# Patient Record
Sex: FEMALE | Race: OTHER
[De-identification: names, ages, dates, MRNs, and addresses within clinical notes are randomized per-mention and may not be internally consistent; named-entity substitution may affect disease eponyms.]

---

## 2019-10-07 ENCOUNTER — HOSPITAL ENCOUNTER (INPATIENT)
Dept: HOSPITAL 88 - ER | Age: 64
LOS: 4 days | Discharge: HOME | DRG: 291 | End: 2019-10-11
Attending: INTERNAL MEDICINE | Admitting: INTERNAL MEDICINE
Payer: COMMERCIAL

## 2019-10-07 VITALS — HEIGHT: 64 IN | WEIGHT: 189.06 LBS | BODY MASS INDEX: 32.28 KG/M2

## 2019-10-07 DIAGNOSIS — J44.1: ICD-10-CM

## 2019-10-07 DIAGNOSIS — I50.23: ICD-10-CM

## 2019-10-07 DIAGNOSIS — I11.0: Primary | ICD-10-CM

## 2019-10-07 DIAGNOSIS — F17.200: ICD-10-CM

## 2019-10-07 DIAGNOSIS — E78.5: ICD-10-CM

## 2019-10-07 DIAGNOSIS — Z87.891: ICD-10-CM

## 2019-10-07 DIAGNOSIS — J96.00: ICD-10-CM

## 2019-10-07 LAB
BASOPHILS # BLD AUTO: 0 10*3/UL (ref 0–0.1)
BASOPHILS NFR BLD AUTO: 0.3 % (ref 0–1)
DEPRECATED NEUTROPHILS # BLD AUTO: 8.6 10*3/UL (ref 2.1–6.9)
EOSINOPHIL # BLD AUTO: 0.1 10*3/UL (ref 0–0.4)
EOSINOPHIL NFR BLD AUTO: 0.8 % (ref 0–6)
ERYTHROCYTE [DISTWIDTH] IN CORD BLOOD: 13.1 % (ref 11.7–14.4)
HCT VFR BLD AUTO: 39 % (ref 34.2–44.1)
HGB BLD-MCNC: 13 G/DL (ref 12–16)
LYMPHOCYTES # BLD: 0.6 10*3/UL (ref 1–3.2)
LYMPHOCYTES NFR BLD AUTO: 6.6 % (ref 18–39.1)
MCH RBC QN AUTO: 31.2 PG (ref 28–32)
MCHC RBC AUTO-ENTMCNC: 33.3 G/DL (ref 31–35)
MCV RBC AUTO: 93.5 FL (ref 81–99)
MONOCYTES # BLD AUTO: 0.1 10*3/UL (ref 0.2–0.8)
MONOCYTES NFR BLD AUTO: 1.1 % (ref 4.4–11.3)
NEUTS SEG NFR BLD AUTO: 90.6 % (ref 38.7–80)
PLATELET # BLD AUTO: 217 X10E3/UL (ref 140–360)
RBC # BLD AUTO: 4.17 X10E6/UL (ref 3.6–5.1)

## 2019-10-07 PROCEDURE — 82553 CREATINE MB FRACTION: CPT

## 2019-10-07 PROCEDURE — 93970 EXTREMITY STUDY: CPT

## 2019-10-07 PROCEDURE — 90732 PPSV23 VACC 2 YRS+ SUBQ/IM: CPT

## 2019-10-07 PROCEDURE — 99284 EMERGENCY DEPT VISIT MOD MDM: CPT

## 2019-10-07 PROCEDURE — 94664 DEMO&/EVAL PT USE INHALER: CPT

## 2019-10-07 PROCEDURE — 85025 COMPLETE CBC W/AUTO DIFF WBC: CPT

## 2019-10-07 PROCEDURE — 83880 ASSAY OF NATRIURETIC PEPTIDE: CPT

## 2019-10-07 PROCEDURE — 71045 X-RAY EXAM CHEST 1 VIEW: CPT

## 2019-10-07 PROCEDURE — 71046 X-RAY EXAM CHEST 2 VIEWS: CPT

## 2019-10-07 PROCEDURE — 94640 AIRWAY INHALATION TREATMENT: CPT

## 2019-10-07 PROCEDURE — 36415 COLL VENOUS BLD VENIPUNCTURE: CPT

## 2019-10-07 PROCEDURE — 83735 ASSAY OF MAGNESIUM: CPT

## 2019-10-07 PROCEDURE — 82550 ASSAY OF CK (CPK): CPT

## 2019-10-07 PROCEDURE — 94660 CPAP INITIATION&MGMT: CPT

## 2019-10-07 PROCEDURE — 93005 ELECTROCARDIOGRAM TRACING: CPT

## 2019-10-07 PROCEDURE — 84484 ASSAY OF TROPONIN QUANT: CPT

## 2019-10-07 PROCEDURE — 82805 BLOOD GASES W/O2 SATURATION: CPT

## 2019-10-07 PROCEDURE — 93306 TTE W/DOPPLER COMPLETE: CPT

## 2019-10-07 PROCEDURE — 80053 COMPREHEN METABOLIC PANEL: CPT

## 2019-10-07 PROCEDURE — 36600 WITHDRAWAL OF ARTERIAL BLOOD: CPT

## 2019-10-07 NOTE — XMS REPORT
Patient Summary Document

                             Created on: 10/07/2019



SAVANAHHERBERT LINDA

External Reference #: [id number]

: 1955

Sex: Female



Demographics







                          Address                   [address]  [address]

 

                          Home Phone                [phone]

 

                          Preferred Language        Unknown

 

                          Marital Status            Unknown

 

                          Restorationism Affiliation     Unknown

 

                          Race                      Unknown

 

                          Ethnic Group              Unknown





Author







                          [facility]                    [facility]

 

                          [facility]

 

                          Address                   Unknown

 

                          Phone                     Unavailable







Support







                Name            Relationship    Address         Phone

 

                    NO,  ONE            PRS                 [facility]

                                        [address]  [address] [phone]

 

                    NO,  ONE            PRS                 [facility]

                                        [address]  [address] [phone]







Care Team Providers







                    Care Team Member Name    Role                Phone

 

                          Unavailable               Unavailable







Payers







             Payer Name    Policy Type    Policy Number    Effective Date    Expiration Date







Problems

This patient has no known problems.



Allergies, Adverse Reactions, Alerts







          Allergy Name    Allergy Type    Status    Severity    Reaction(s)    Onset Date    Inactive 

Date                      Treating Clinician        Comments

 

        No Known Allergies    DA      Active    U               2019-09-10 00:00:00                     

 

        No Known Allergies    DA      Active    U               2019 00:00:00                     







Medications

This patient has no known medications.



Results







           Test Description    Test Time    Test Comments    Text Results    Atomic Results    Result

 Comments

 

                GLUBED          2019 06:44:00                      

 

   

 

                GLUBED (test code=GLUBED)    143 mg/dL                 Performed by certified  at

 [facility]





AB MYCOPLASMA [id number] 01:06:00* 





                Test Item       Value           Reference Range    Comments

 

                AB MYCOPLAS PNEUMONIAE IGM EIA (test code=MYCOMABEIA)    <770 U/mL       0-769                  

                      Negative            <770Clinically significant amount of 
M. pneumoniae antibodynot detected.                             Low Positive   
770 - 950M. pneumoniae specific IgM presumptively detected.  Itis recommended 
that another sample be collected 1-2weeks later to assure reactivity.           
                 Positive            >950Highly significant amount of M. p
neumoniae specificIgM antibody detected.Performed At: [address] [id number] Stewart MD Ph:[phone]            
Negative            <770     Clinically significant amount of M.pneumoniae 
antibody  not detected.               Low Positive        770 - 950     M. 
pneumoniae specific IgM presumptively detected.    It is recommended that 
another sample be collected  1-2 weeks later to assure reactivity.              
Positive           >950     Highly significant amount of M.pneumoniae specific  
  IgM antibody detected. 

 

                AB MYCOPLAS PNEUMO IGG EIA (test code=MYCOGABEIA)    <100 U/mL       0-99                        

                 Negative:           <100                             
Indeterminate: 100 - 320                             Positive:           >320The
reference interval established is intended as abaseline only.  Values >100 may 
indicate a recentinfection with Mycoplasma pneumoniae and need to beconfirmed 
either by a positive IgM result and/or anadditional specimen drawn 2-4 weeks 
later showing asignificant increase in antibody levels.  Negative:           
<100     Indeterminate: 100 - 320     Positive:           >320 





COMPREHENSIVE METABOLIC PANEL2019-09-15 10:08:00* 





                Test Item       Value           Reference Range    Comments

 

                SODIUM (test code=NA)    137 mmol/L      136-145          

 

                POTASSIUM (test code=K)    3.9 mmol/L      3.5-5.1          

 

                CHLORIDE (test code=CL)    99.0 mmol/L                

 

                CARBON DIOXIDE (test code=CO2)    32.0 mmol/L     21-32            

 

                ANION GAP (test code=GAP)    9.9             10-20            

 

                GLUCOSE (test code=GLU)    97 mg/dL                   

 

                BLOOD UREA NITROGEN (test code=BUN)    10 mg/dL        7-18             

 

                GLOMERULAR FILTRATION RATE (test code=GFR)    > 60 mL/min     >=60            Estimated GFR by 

using Modified MDRD formula.Chronic kidney disease is defined as either kidney 
damageor GFR <60 mL/min/1.73 m2 for >3 months.

 

                CREATININE (test code=CREAT)    0.60 mg/dL      0.55-1.02       **Note change in reference 

range due to change in reagent.**

 

                BUN/CREATININE RATIO (test code=BUN/CREA)    16.7            10-20            

 

                TOTAL PROTEIN (test code=PROT)    6.2 gram/dL     6.4-8.2          

 

                ALBUMIN (test code=ALB)    2.4 g/dL        3.4-5.0          

 

                GLOBULIN (test code=GLOB)    3.8 gram/dL     2.7-4.2          

 

                ALBUMIN/GLOBULIN RATIO (test code=A/G)    0.6             0.75-1.50        

 

                CALCIUM (test code=CA)    9.5 mg/dL       8.5-10.1         

 

                BILIRUBIN TOTAL (test code=BILT)    0.20 mg/dL      0.0-1.0          

 

                SGOT/AST (test code=AST)    16 IUnit/L      15-37            

 

                SGPT/ALT (test code=ALT)    60 IUnit/L      12-78            

 

                ALKALINE PHOSPHATASE TOTAL (test code=ALKP)    104 IUnit/L               **Note change 

in reference range due to change in reagent.**





MAGNESIUM2019-09-15 10:08:00* 





                Test Item       Value           Reference Range    Comments

 

                MAGNESIUM (test code=MAG)    1.9 mg/dL       1.8-2.4          





ALPHA 1 ANTITRYPSIN2019-09-15 10:08:00* 





                Test Item       Value           Reference Range    Comments

 

                ALPHA 1 ANTITRYPSIN (test code=NQNX3THN)    237 mg/dL                 Performed At: [facility]

 [id number] [facility] [address] [place], [place] [id number] CN MD Ph:[phone]





BASIC METABOLIC [id number] 05:46:00* 





                Test Item       Value           Reference Range    Comments

 

                SODIUM (test code=NA)    137 mmol/L      136-145          

 

                POTASSIUM (test code=K)    4.3 mmol/L      3.5-5.1          

 

                CHLORIDE (test code=CL)    98.0 mmol/L                

 

                CARBON DIOXIDE (test code=CO2)    33.0 mmol/L     21-32            

 

                ANION GAP (test code=GAP)    10.3            10-20            

 

                GLUCOSE (test code=GLU)    113 mg/dL                  

 

                BLOOD UREA NITROGEN (test code=BUN)    14 mg/dL        7-18             

 

                GLOMERULAR FILTRATION RATE (test code=GFR)    > 60 mL/min     >=60            Estimated GFR by 

using Modified MDRD formula.Chronic kidney disease is defined as either kidney 
damageor GFR <60 mL/min/1.73 m2 for >3 months.

 

                CREATININE (test code=CREAT)    0.60 mg/dL      0.55-1.02       **Note change in reference 

range due to change in reagent.**

 

                BUN/CREATININE RATIO (test code=BUN/CREA)    23.3            10-20            

 

                CALCIUM (test code=CA)    10.1 mg/dL      8.5-10.1         





BASIC METABOLIC [id number] 05:36:00* 





                Test Item       Value           Reference Range    Comments

 

                SODIUM (test code=NA)    137 mmol/L      136-145          

 

                POTASSIUM (test code=K)    4.3 mmol/L      3.5-5.1          

 

                CHLORIDE (test code=CL)    98.0 mmol/L                

 

                CARBON DIOXIDE (test code=CO2)     mmol/L         21-32            

 

                ANION GAP (test code=GAP)                    10-20            

 

                GLUCOSE (test code=GLU)     mg/dL                     

 

                BLOOD UREA NITROGEN (test code=BUN)     mg/dL          7-18             

 

                GLOMERULAR FILTRATION RATE (test code=GFR)     mL/min         >=60             

 

                CREATININE (test code=CREAT)     mg/dL          0.55-1.02        

 

                BUN/CREATININE RATIO (test code=BUN/CREA)                    10-20            

 

                CALCIUM (test code=CA)     mg/dL          8.5-10.1         





- XR FOREARM 2 VIEWS [id number] 14:24:00 FAX:         Ernesto Solis MD 
[phone]    [place]:    St: ADM----------
---------------------------------------------------------------------  Name:   LINDA GUTIERREZ                    [facility]                     : [birth year]  Age/S: 64/F           [address] [facility]                Unit #: [id number]    
 Loc: [place]  [address]              Phys: Ernesto Solis MD 
                                              Acct: [id number] Dis Date:      
        Status: ADM IN                                 PHONE #: [phone]    
Exam Date:     2019     1404                   FAX #: [phone]     
Reason: RIGHT RADIAL HEAD FRACTURE                         EXAMS:               
                               CPT CODE:      [id number] XR FOREARM 2 VIEWS RT   
                  [address]                    CLINICAL HISTORY: RIGHT RADIAL HEAD 
FRACTURE               TECHNIQUE: 2 views of the right forearm               C
OMPARISON: None               FINDINGS:               Interval placement of cast
partly obscures fine bony details and soft       tissues.       Redemonstration 
of minimally displaced fracture of the radial head. No       change in bony ali
gnment. Remaining bones are within normal limits.                               
 IMPRESSION:                   Stable appearance of the radial head fracture fr
om the previous         examination.          ** Electronically Signed by Jesus Blount MD on 2019 at 1424 **                      Reported and signed by: RUI Blount MD                     CC: Ernesto Solis MD                      
                                                                                
            Technologist: BONNIE JALLOH RT(R)                                    
Trnscrd Date/Time/By: 2019 (9769) : By: [email or web address]          Orig Print 
D/T: S: 2019 (7190)                         PAGE  1                       
Signed Report                               SED RATE [id number] 
13:03:00* 





                Test Item       Value           Reference Range    Comments

 

                SED RATE WESTERGREN (test code=SEDW)    47 mm/hr        0-20             





SED [id number] 13:03:00* 





                Test Item       Value           Reference Range    Comments

 

                SED RATE (test code=SEDW)    47 mm/hr        0-20            WINTROBE METHOD: NORMAL RANGE FOR MEN:

 0-9 MM/HR                  WOMAN: 0-20 MM/HR





COMPREHENSIVE METABOLIC [id number] 06:29:00* 





                Test Item       Value           Reference Range    Comments

 

                SODIUM (test code=NA)    137 mmol/L      136-145          

 

                POTASSIUM (test code=K)    3.9 mmol/L      3.5-5.1          

 

                CHLORIDE (test code=CL)    99.0 mmol/L                

 

                CARBON DIOXIDE (test code=CO2)    32.0 mmol/L     21-32            

 

                ANION GAP (test code=GAP)    9.9             10-20            

 

                GLUCOSE (test code=GLU)    97 mg/dL                   

 

                BLOOD UREA NITROGEN (test code=BUN)    10 mg/dL        7-18             

 

                GLOMERULAR FILTRATION RATE (test code=GFR)    > 60 mL/min     >=60            Estimated GFR by 

using Modified MDRD formula.Chronic kidney disease is defined as either kidney 
damageor GFR <60 mL/min/1.73 m2 for >3 months.

 

                CREATININE (test code=CREAT)    0.60 mg/dL      0.55-1.02       **Note change in reference 

range due to change in reagent.**

 

                BUN/CREATININE RATIO (test code=BUN/CREA)    16.7            10-20            

 

                TOTAL PROTEIN (test code=PROT)    6.2 gram/dL     6.4-8.2          

 

                ALBUMIN (test code=ALB)    2.4 g/dL        3.4-5.0          

 

                GLOBULIN (test code=GLOB)    3.8 gram/dL     2.7-4.2          

 

                ALBUMIN/GLOBULIN RATIO (test code=A/G)    0.6             0.75-1.50        

 

                CALCIUM (test code=CA)    9.5 mg/dL       8.5-10.1         

 

                BILIRUBIN TOTAL (test code=BILT)    0.20 mg/dL      0.0-1.0          

 

                SGOT/AST (test code=AST)    16 IUnit/L      15-37            

 

                SGPT/ALT (test code=ALT)    60 IUnit/L      12-78            

 

                ALKALINE PHOSPHATASE TOTAL (test code=ALKP)    104 IUnit/L               **Note change 

in reference range due to change in reagent.**





[id number] 06:29:00* 





                Test Item       Value           Reference Range    Comments

 

                MAGNESIUM (test code=MAG)    1.9 mg/dL       1.8-2.4          





ALPHA 1 [id number] 06:29:00* 





                Test Item       Value           Reference Range    Comments

 

                ALPHA 1 ANTITRYPSIN (test code=IBXB1TXD)     mg/dL                           





C REACTIVE [id number] 06:29:00* 





                Test Item       Value           Reference Range    Comments

 

                C REACTIVE PROTEIN (test code=CRP)    3.96 mg/dL      0-0.3            





B-TYPE NATRIURETIC [id number] 06:13:00* 





                Test Item       Value           Reference Range    Comments

 

                B-TYPE NATRIURETIC PEPTIDE (test code=BNP)    402.56 pgram/mL    0-100            





COMPREHENSIVE METABOLIC [id number] 06:02:00* 





                Test Item       Value           Reference Range    Comments

 

                SODIUM (test code=NA)    137 mmol/L      136-145          

 

                POTASSIUM (test code=K)    3.9 mmol/L      3.5-5.1          

 

                CHLORIDE (test code=CL)    99.0 mmol/L                

 

                CARBON DIOXIDE (test code=CO2)     mmol/L         21-32            

 

                ANION GAP (test code=GAP)                    10-20            

 

                GLUCOSE (test code=GLU)     mg/dL                     

 

                BLOOD UREA NITROGEN (test code=BUN)     mg/dL          7-18             

 

                GLOMERULAR FILTRATION RATE (test code=GFR)     mL/min         >=60             

 

                CREATININE (test code=CREAT)     mg/dL          0.55-1.02        

 

                BUN/CREATININE RATIO (test code=BUN/CREA)                    10-20            

 

                TOTAL PROTEIN (test code=PROT)     gram/dL        6.4-8.2          

 

                ALBUMIN (test code=ALB)     g/dL           3.4-5.0          

 

                GLOBULIN (test code=GLOB)     gram/dL        2.7-4.2          

 

                ALBUMIN/GLOBULIN RATIO (test code=A/G)                    0.75-1.50        

 

                CALCIUM (test code=CA)     mg/dL          8.5-10.1         

 

                BILIRUBIN TOTAL (test code=BILT)     mg/dL          0.0-1.0          

 

                SGOT/AST (test code=AST)     IUnit/L        15-37            

 

                SGPT/ALT (test code=ALT)     IUnit/L        12-78            

 

                ALKALINE PHOSPHATASE TOTAL (test code=ALKP)     IUnit/L                   





[id number] 06:02:00* 





                Test Item       Value           Reference Range    Comments

 

                MAGNESIUM (test code=MAG)     mg/dL          1.8-2.4          





ALPHA 1 [id number] 06:02:00* 





                Test Item       Value           Reference Range    Comments

 

                ALPHA 1 ANTITRYPSIN (test code=[id number])     mg/dL                           





CBC W/AUTO [id number] 06:01:00* 





                Test Item       Value           Reference Range    Comments

 

                WHITE BLOOD CELL (test code=WBC)    7.3 K/mm3       4.5-12.5         

 

                RED BLOOD CELL (test code=RBC)    3.53 mill/mm3    3.7-5.2          

 

                HEMOGLOBIN (test code=HGB)    11.1 gram/dL    11.5-15.5        

 

                HEMATOCRIT (test code=HCT)    33.5 %          36.0-46.0        

 

                MEAN CELL VOLUME (test code=MCV)    94.9 fL         80-98            

 

                MEAN CELL HGB (test code=MCH)    31.4 picogram    27.0-33.0        

 

                MEAN CELL HGB CONCETRATION (test code=MCHC)    33.1 gram/dL    33.0-36.0        

 

                RED CELL DISTRIBUTION WIDTH (test code=RDW)    13.5 %          11.6-16.2        

 

                RED CELL DISTRIBUTION WIDTH SD (test code=RDW-SD)    46.6 fL         37.0-51.0        

 

                PLATELET COUNT (test code=PLT)    242 K/mm3       150-450         RESULT VERIFIED BY REPEAT ANALYSIS



 

                MEAN PLATELET VOLUME (test code=MPV)    10.1 fL         6.7-11.0         

 

                NEUTROPHIL % (test code=NT%)    74.5 %          39.0-69.0        

 

                IMMATURE GRANULOCYTE % (test code=IG%)    0.7 %           0.0-5.0          

 

                LYMPHOCYTE % (test code=LY%)    16.3 %          25.0-55.0        

 

                MONOCYTE % (test code=MO%)    8.3 %           0.0-10.0         

 

                EOSINOPHIL % (test code=EO%)    0.1 %           0.0-5.0          

 

                BASOPHIL % (test code=BA%)    0.1 %           0.0-1.0          

 

                NUCLEATED RBC % (test code=NRBC%)    0.0 %           0-0              

 

                NEUTROPHIL # (test code=NT#)    5.40 K/mm3      1.8-7.7          

 

                IMMATURE GRANULOCYTE # (test code=IG#)    0.05 x10 3/uL    0-0.03           

 

                LYMPHOCYTE # (test code=LY#)    1.18 K/mm3      1.0-5.0          

 

                MONOCYTE # (test code=MO#)    0.60 K/mm3      0-0.8            

 

                EOSINOPHIL # (test code=EO#)    0.01 K/mm3      0.0-0.5          

 

                BASOPHIL # (test code=BA#)    0.01 K/mm3      0.0-0.2          

 

                NUCLEATED RBC # (test code=NRBC#)    0.00 K/mm3      0.0-0.1          

 

                MANUAL DIFF REQUIRED (test code=MDIFF)    NO                               





LEGIONELLA ANTIGEN,URINE,[id number] 02:39:00* 





                Test Item       Value           Reference Range    Comments

 

                LEGIONELLA ANTIGEN,URINE,ERICKA (test code=LEGAGUR)    NEGATIVE        NEGATIVE         





- CTA [id number] 18:10:00  Name: LINDA JOSHUA                     [facility]                     : 1955 Age/S: 64  / F         [address] 
[facility]                Unit #: [id number]     Loc:               [place],  
[place]  [address]              Phys: Dick Garcia MD                                  
                Acct: [id number]  Dis Date:               Status: ADM IN      
                           PHONE #: [phone]     Exam Date: 2019  6192
                    FAX #: [phone]      Reason: hypoxemic respfailure, 
shortness of breath          EXAMS:                                             
 CPT CODE:      [id number] CTA CHEST                                  [address]      
             REASON FOR EXAM: hypoxemic respfailure, shortness of breath        
      EXAM ORDER DATE: 2019 12:35 PM               Ordering M.DDeanne: Dick Garcia MD               PROCEDURE:  - CTA CHEST                
Comparison:Frontal chest x-ray earlier today at 10:48 AM               Axial CT 
images of the chest were obtained following administration of       IV contrast 
using a PE protocol. Reconstructed sagittal and coronal       images of the 
chest were provided for interpretation.  Dose reduction       techniques were 
applied.                       FINDINGS:                Visualized neck: Normal 
             Airways, Lungs and Pleura: Central airways are clear. There is     
 subsegmental atelectasis in the bilateral lung bases, more pronounced       on 
the right side. No pleural effusion or pneumothorax.       In the posterior 
segment of the right upper lobe (3/25 through 3/27)       there are a few 
airspace opacities which may represent an infectious       process.             
 Heart, great vessels, pulmonary vessels, mediastinum: No pulmonary       
embolus to the level of the segmental arteries. Pulmonary trunk is       s
lightly enlarged measuring 3.2 cm in diameter. No evidence of right       heart 
strain is seen. Atherosclerotic disease is scattered throughout       the thorac
ic aorta and the coronary arteries. No pericardial effusion.               Lymph
nodes: Prominent hilar lymph nodes are seen bilaterally. There       are also s
ubcentimeter mediastinal lymph nodes. No axillary or       internal mammary celeste
opathy.               Musculoskeletal/chest wall: Degenerative changes are prese
nt in the       bilateral shoulders and throughout the spine.               Visu
alized upper abdomen: Normal                         IMPRESSION:               P
AGE  3                       Signed Report                    (CONTINUED)   Name
: LINDA JOSHUA                     [facility]                     :  Age/S: 64  / F         [address] [facility]                Unit #: [id number]
    Loc:               [place]  [address]              Phys: Dick Garcia MD
                                                  Acct: [id number]  Dis Date: 
             Status: ADM IN                                  PHONE #: [phone]
[phone]     Exam Date: 2019  175                     FAX #: [phone]    
 Reason: hypoxemic respfailure, shortness of breath          EXAMS:             
                                 CPT CODE:      [id number] CTA CHEST             
                    [address]               <Continued>          No pulmonary 
embolus or evidence of right heart strain.         Increased caliber of the 
pulmonary trunk suggests pulmonary artery         hypertension.         
Opacities in the posterior segment of the right upper lobe may         represent
an infectious process.         Bilateral hilar adenopathy is present. These are 
nonspecific and may         be reactive.         Mild subsegmental atelectasis 
in the bilateral lung bases.          ** Electronically Signed by Jesus Blount MD
on 2019 at 1810 **                      Reported and signed by: Jesus Blount MD                              CC: Ernesto Solis MD; Dick Garcia MD                                                                              
                  Technologist:Katy Wharton RT(R),CT;   CTDI:        DLP:
       Trnscb Date/Time: 2019 () [email or web address]                       Orig
Print D/T: S: 2019 ()      PAGE  2                       Signed Report
                              ARTERIAL BLOOD [id number] 14:51:00* 





                Test Item       Value           Reference Range    Comments

 

                ARTERIAL BLOOD GAS PH (test code=PHA)    7.45            7.35-7.45        

 

                ARTERIAL BLOOD GAS PCO2 (test code=PCO2A)    43.5 mm Hg      35-45            

 

                ARTERIAL BLOOD GAS PO2 (test code=PO2A)    42.4 mmHg                 Results called to and

 read back by [employer] 14:50 - 2019; by [employer]

 

                BICARBONATE TOTAL HCO3 (test code=HCO3)    29.7 mmol/L     23.0-27.0        

 

                BASE EXCESS (test code=ANJALI)    5.1 mmol/L      -3.0-5.0         

 

                ABG O2 SATURATION (test code=SATA)    80.5 %          90.0-98.0        

 

                ABG TYPE (test code=TYPEA)    Arterial                         

 

                FIO2 (test code=FIO2A)    21.0                             

 

                ABG SITE (test code=SITEA)    Lt RADIAL ARTERY                     

 

                MODIFIED ALLENS (test code=MODALL)    Yes CHECK       PERFORMED        

 

                HEMATOCRIT (test code=HCT/ABG)    36 %            35-47            

 

                TOTAL HGB (test code=THB)    12.4 gram/dL    11.5-15.5        

 

                HGB O2 SAT (test code=HBOSAT)    80.2 %          94.00-98.00      

 

                CARBOXYHEMOGLOBIN (test code=HOHGBT)    0.1 %totalHg    0.5-1.5         Results called to and

 read back by [employer] 14:50 - 2019; by [employer]

 

                METHEMOGLOBIN (test code=METHGB)    0.3 %           0.0-1.50         

 

                O2 CONTENT (test code=O2CT)    14.0 % vol      18.0-22.0        





PROCALCITONIN (PCT)2019 13:07:00* 





                Test Item       Value           Reference Range    Comments

 

                PROCALCITONIN (PCT) (test code=PROCAL)    0.20 ng/ml                      Concentration   Interpretation

   (ng/mL)      -------------   
--------------------------------------------<0.51           Sepsis is not 
likely.                Local bacterial infection is possible.                
(LOW RISK for progression to Sepsis) 0.51 - 2.00     Sepsis is possible, but 
other conditions                are known to elevate PCT as well.               
(MODERATE RISK for progression to Sepsis) > 2.00          Sepsis is likely, 
unless other causes are                known.                (HIGH RISK for 
progression to Severe Sepsis                or Septic Shock) 10.00           
High likelihood of Severe Sepsis or Septic  or higher     Shock.                
*Increased PCT levels may not always be related to systemic bacterial 
infection.*Low PCT levels do not automatically exclude the presence of bacterial
infection.*All results should be interpreted taking into account the  patients 
history.





[id number] 12:25:00* 





                Test Item       Value           Reference Range    Comments

 

                GLUBED (test code=GLUBED)    152 mg/dL                 Performed by certified  at

 [facility]





- XR CHEST 1 -52-66 10:58:00 FAX:         Ernesto Solis [phone]
   [place]: B   St: ADM----------
---------------------------------------------------------------------  Name:   LINDA GUTIERREZ                    [facility]                     : [birth year]  Age/S: 64/F           [address] [facility]                Unit #: [id number]    
 Loc: [place]  [address]              Phys: Ernesto Solis MD 
                                              Acct: [id number] Dis Date:      
        Status: ADM IN                                 PHONE #: [phone]    
Exam Date:     2019     1057                   FAX #: [phone]     
Reason: hypoxia                                            EXAMS:               
                               CPT CODE:      [id number] XR CHEST 1 V            
                  [id number]                    HISTORY: Hypoxia.               CO
MPARISON: 2019.               No acute infiltrates, effusion or co
ngestion is noted. Lung scarring.       Cardiomegaly.                  IMPRESSIO
N:                    No acute infiltrates, effusion or congestion.          ** 
Electronically Signed by PERI Sexton on 2019 at 1058 **             
        Reported and signed by: Magdiel Sexton M.D.                              
CC: Ernesto Solis MD                                                        
                                                           Technologist: RT MAIN(R)                                     Trnscrd Date/Time/By: 20
19 (8739) : By: [email or web address]           Orig Print D/T: S: 2019 (3962)        
                PAGE  1                       Signed Report                     
         BASIC METABOLIC [id number] 02:19:00* 





                Test Item       Value           Reference Range    Comments

 

                SODIUM (test code=NA)    138 mmol/L      136-145          

 

                POTASSIUM (test code=K)    3.8 mmol/L      3.5-5.1          

 

                CHLORIDE (test code=CL)    101.0 mmol/L               

 

                CARBON DIOXIDE (test code=CO2)    27.0 mmol/L     21-32            

 

                ANION GAP (test code=GAP)    13.8            10-20            

 

                GLUCOSE (test code=GLU)    131 mg/dL                  

 

                BLOOD UREA NITROGEN (test code=BUN)    12 mg/dL        7-18             

 

                GLOMERULAR FILTRATION RATE (test code=GFR)    > 60 mL/min     >=60            Estimated GFR by 

using Modified MDRD formula.Chronic kidney disease is defined as either kidney 
damageor GFR <60 mL/min/1.73 m2 for >3 months.

 

                CREATININE (test code=CREAT)    0.70 mg/dL      0.55-1.02       **Note change in reference 

range due to change in reagent.**

 

                BUN/CREATININE RATIO (test code=BUN/CREA)    17.1            10-20            

 

                CALCIUM (test code=CA)    8.6 mg/dL       8.5-10.1         





[id number] 02:19:00* 





                Test Item       Value           Reference Range    Comments

 

                MAGNESIUM (test code=MAG)    1.8 mg/dL       1.8-2.4          





BASIC METABOLIC [id number] 02:03:00* 





                Test Item       Value           Reference Range    Comments

 

                SODIUM (test code=NA)    138 mmol/L      136-145          

 

                POTASSIUM (test code=K)    3.8 mmol/L      3.5-5.1          

 

                CHLORIDE (test code=CL)    101.0 mmol/L               

 

                CARBON DIOXIDE (test code=CO2)     mmol/L         21-32            

 

                ANION GAP (test code=GAP)                    10-20            

 

                GLUCOSE (test code=GLU)     mg/dL                     

 

                BLOOD UREA NITROGEN (test code=BUN)     mg/dL          7-18             

 

                GLOMERULAR FILTRATION RATE (test code=GFR)     mL/min         >=60             

 

                CREATININE (test code=CREAT)     mg/dL          0.55-1.02        

 

                BUN/CREATININE RATIO (test code=BUN/CREA)                    10-20            

 

                CALCIUM (test code=CA)     mg/dL          8.5-10.1         





[id number] 02:03:00* 





                Test Item       Value           Reference Range    Comments

 

                MAGNESIUM (test code=MAG)     mg/dL          1.8-2.4          





CBC W/O [id number] 01:33:00* 





                Test Item       Value           Reference Range    Comments

 

                WHITE BLOOD CELL (test code=WBC)    7.1 K/mm3       4.5-12.5         

 

                RED BLOOD CELL (test code=RBC)    3.64 mill/mm3    3.7-5.2          

 

                HEMOGLOBIN (test code=HGB)    11.4 gram/dL    11.5-15.5        

 

                HEMATOCRIT (test code=HCT)    35.4 %          36.0-46.0        

 

                MEAN CELL VOLUME (test code=MCV)    97.3 fL         80-98            

 

                MEAN CELL HGB (test code=MCH)    31.3 picogram    27.0-33.0        

 

                MEAN CELL HGB CONCETRATION (test code=MCHC)    32.2 gram/dL    33.0-36.0        

 

                RED CELL DISTRIBUTION WIDTH (test code=RDW)    13.3 %          11.6-16.2        

 

                PLATELET COUNT (test code=PLT)    187 K/mm3       150-450          

 

                MEAN PLATELET VOLUME (test code=MPV)    9.8 fL          6.7-11.0         





B-TYPE NATRIURETIC [id number] 17:19:00* 





                Test Item       Value           Reference Range    Comments

 

                B-TYPE NATRIURETIC PEPTIDE (test code=BNP)    175.24 pgram/mL    0-100            





- XR CHEST 1 -85-94 16:01:00 FAX:         Ernesto Solis [phone]
   [place]: B   St: ADM----------
---------------------------------------------------------------------  Name:   LINDA GUTIERREZ                    [facility]                     : [birth year]  Age/S: 64/F           [address]                Unit #: [id number]    
 Loc: [place]  [address]              Phys: Ernesto Solis MD 
                                              Acct: [id number] Dis Date:      
        Status: ADM IN                                 PHONE #: [phone]    
Exam Date:     2019     1507                   FAX #: [phone]     
Reason: shortness of breath                                EXAMS:               
                               CPT CODE:      [id number] XR CHEST 1 V            
                  [id number]                            REASON FOR EXAM: shortness of
breath               Exam Order Date: 2019 2:55 PM               Ordering 
M.D.: Ernesto Solis MD               PROCEDURE:  - XR CHEST 1 V             
 COMPARISON: Frontal chest x-ray the previous night               FINDINGS:     
   There is engorgement of the pulmonary vessels and there are subtle       
airspace opacities in the lung bases. No pleural effusion.               Cardio
mediastinal silhouette is prominent but stable in size.               Degenerati
ve changes are present in the spine. Upper abdomen is within       normal limits
.                         IMPRESSION:         Findings of CHF with pulmonary tony
ma in the lower lung zones.         Superimposed infection would be difficult to
exclude.          ** Electronically Signed by Jesus Blount MD on 2019 at 1
601 **                      Reported and signed by: Jesus Blount MD              
 CC: Ernesto Solis MD                                                       
                                                            Technologist: Kitty Christianson RT(R)                                   Trnscrd Date/Time/By: 2019
(1605) : By: [email or web address]          Orig Print D/T: S: 2019 (1614)           
             PAGE  1                       Signed Report                        
      [id number]--84-11 11:57:00* 





                Test Item       Value           Reference Range    Comments

 

                TROPONIN-I (test code=TROPI)    <0.015 ng/mL    0-0.045          





COMMENTS TO PHLEBOTOMIST: COLLECT 3 HOURS AFTER PREVIOUS                        
  [id number]--30-11 08:39:00* 





                Test Item       Value           Reference Range    Comments

 

                TROPONIN-I (test code=TROPI)    <0.015 ng/mL    0-0.045          





COMMENTS TO PHLEBOTOMIST: COLLECT 3 HOURS AFTER PREVIOUS                        
  SAMPLEURINALYSIS [id number] 07:28:00* 





                Test Item       Value           Reference Range    Comments

 

                UA COLOR (test code=COLU)    YELLOW          YELLOW           

 

                UA APPEARANCE (test code=APPU)    Cloudy          CLEAR            

 

                UA GLUCOSE DIPSTICK (test code=DGLUU)    NEGATIVE mg/dL    NEGATIVE         

 

                UA BILIRUBIN DIPSTICK (test code=BILU)    NEGATIVE mg/dL    NEGATIVE         

 

                UA KETONE DIPSTICK (test code=KETU)    NEGATIVE mg/dL    NEGATIVE         

 

                UA SPECIFIC GRAVITY (test code=SGU)    1.008           1.001-1.035      

 

                UA BLOOD DIPSTICK (test code=DONNA)    0.03 mg/dL (Trace) mg/dL    NEGATIVE         

 

                UA PH DIPSTICK (test code=DIDI)    6.0             5.0-8.0          

 

                UA PROTEIN DIPSTICK (test code=PROU)    20 (Trace) mg/dL    NEGATIVE         

 

                UA UROBILINIOGEN DIPSTICK (test code=URO)    Normal mg/dL    NEGATIVE         

 

                UA NITRITE DIPSTICK (test code=HAILEE)    NEGATIVE        NEGATIVE         

 

                    UA LEUKOCYTE ESTERASE W REFLEX (test code=LEUUR)    75 Endy/uL (1+) Endy/uL    NEGATIVE

                                         

 

                UA WBC (test code=WBCU)    11-20 per HPF    0-5              

 

                UA RBC (test code=RBCU)    3-5 #/HPF       0-5              

 

                UA EPITHELIAL CELLS (test code=EPIU)    FEW per HPF     FEW              

 

                UA BACTERIA (test code=BACU)    MODERATE #/HPF    NONE             

 

                UA MUCUS (test code=MUCU)    FEW #/LPF       FEW              





Urine Source? Clean CatchBASIC METABOLIC [id number] 00:26:00* 





                Test Item       Value           Reference Range    Comments

 

                SODIUM (test code=NA)    135 mmol/L      136-145          

 

                POTASSIUM (test code=K)    3.8 mmol/L      3.5-5.1          

 

                CHLORIDE (test code=CL)    98.0 mmol/L                

 

                CARBON DIOXIDE (test code=CO2)    31.0 mmol/L     21-32            

 

                ANION GAP (test code=GAP)    9.8             10-20            

 

                GLUCOSE (test code=GLU)    128 mg/dL                  

 

                BLOOD UREA NITROGEN (test code=BUN)    8 mg/dL         7-18             

 

                GLOMERULAR FILTRATION RATE (test code=GFR)    > 60 mL/min     >=60            Estimated GFR by 

using Modified MDRD formula.Chronic kidney disease is defined as either kidney 
damageor GFR <60 mL/min/1.73 m2 for >3 months.

 

                CREATININE (test code=CREAT)    0.70 mg/dL      0.55-1.02       **Note change in reference 

range due to change in reagent.**

 

                BUN/CREATININE RATIO (test code=BUN/CREA)    11.4            10-20            

 

                CALCIUM (test code=CA)    9.1 mg/dL       8.5-10.1         





HEPATIC FUNCTION [id number] 00:26:00* 





                Test Item       Value           Reference Range    Comments

 

                TOTAL PROTEIN (test code=PROT)    7.2 gram/dL     6.4-8.2          

 

                ALBUMIN (test code=ALB)    3.0 g/dL        3.4-5.0          

 

                GLOBULIN (test code=GLOB)    4.2 gram/dL     2.7-4.2          

 

                ALBUMIN/GLOBULIN RATIO (test code=A/G)    0.7             0.75-1.50        

 

                BILIRUBIN TOTAL (test code=BILT)    0.70 mg/dL      0.0-1.0          

 

                BILIRUBIN DIRECT (test code=BILD)    0.22 mg/dL      0.0-0.20         

 

                SGOT/AST (test code=AST)    11 IUnit/L      15-37            

 

                SGPT/ALT (test code=ALT)    18 IUnit/L      12-78            

 

                ALKALINE PHOSPHATASE TOTAL (test code=ALKP)    90 IUnit/L                **Note change in

 reference range due to change in reagent.**





[id number] 00:26:00* 





                Test Item       Value           Reference Range    Comments

 

                TROPONIN-I (test code=TROPI)    <0.015 ng/mL    0-0.045          





LACTIC [id number] 00:23:00* 





                Test Item       Value           Reference Range    Comments

 

                LACTIC ACID (test code=LACT)    0.9 mmol/L      0.4-1.9          





BASIC METABOLIC [id number] 00:23:00* 





                Test Item       Value           Reference Range    Comments

 

                SODIUM (test code=NA)    135 mmol/L      136-145          

 

                POTASSIUM (test code=K)    3.8 mmol/L      3.5-5.1          

 

                CHLORIDE (test code=CL)    98.0 mmol/L                

 

                CARBON DIOXIDE (test code=CO2)     mmol/L         21-32            

 

                ANION GAP (test code=GAP)                    10-20            

 

                GLUCOSE (test code=GLU)     mg/dL                     

 

                BLOOD UREA NITROGEN (test code=BUN)     mg/dL          7-18             

 

                GLOMERULAR FILTRATION RATE (test code=GFR)     mL/min         >=60             

 

                CREATININE (test code=CREAT)     mg/dL          0.55-1.02        

 

                BUN/CREATININE RATIO (test code=BUN/CREA)                    10-20            

 

                CALCIUM (test code=CA)     mg/dL          8.5-10.1         





HEPATIC FUNCTION [id number] 00:23:00* 





                Test Item       Value           Reference Range    Comments

 

                TOTAL PROTEIN (test code=PROT)     gram/dL        6.4-8.2          

 

                ALBUMIN (test code=ALB)     g/dL           3.4-5.0          

 

                GLOBULIN (test code=GLOB)     gram/dL        2.7-4.2          

 

                ALBUMIN/GLOBULIN RATIO (test code=A/G)                    0.75-1.50        

 

                BILIRUBIN TOTAL (test code=BILT)     mg/dL          0.0-1.0          

 

                BILIRUBIN DIRECT (test code=BILD)     mg/dL          0.0-0.20         

 

                SGOT/AST (test code=AST)     IUnit/L        15-37            

 

                SGPT/ALT (test code=ALT)     IUnit/L        12-78            

 

                ALKALINE PHOSPHATASE TOTAL (test code=ALKP)     IUnit/L                   





[id number]--01-11 00:23:00* 





                Test Item       Value           Reference Range    Comments

 

                TROPONIN-I (test code=TROPI)     ng/mL          0-0.045          





- XR CHEST 1 -03-65 00:20:00 FAX:         Jerad Clemons MD    [phone]
   [place]: B   St: REG----------
---------------------------------------------------------------------  Name:   LINDA GUTIERREZ                    [facility]                     : [birth year]  Age/S: 64/F           [address] [facility]                Unit #: [id number]    
 Loc: [place]  [address]              Phys: Jerad Clemons MD    
                                              Acct: [id number] Dis Date:      
        Status: REG ER                                 PHONE #: [phone]    
Exam Date:     09/10/2019     0014                   FAX #: [phone]     
Reason: CODE SEPSIS                                        EXAMS:               
                               CPT CODE:      [id number] XR CHEST 1 V            
                  [id number]                    CHEST               LOCATION:  R16   
           CLINICAL HISTORY:       Code sepsis.               COMPARISON:       
No previous exam available.               FINDINGS:         Cardiomegaly is 
present, associated with atherosclerotic       calcifications in the aorta.  Pul
monary vascular congestion is       present.  No pleural fluids.  No acute bony 
abnormality is found.                 IMPRESSION:         Pulmonary vascular con
gestion.          ** Electronically Signed by Nilsa Carcamo MD on 2019 at 002
0 **                      Reported and signed by: Nilsa Carcamo MD                
      CC: Jerad Clemons MD                                                     
                                                                 Technologist: 
Veronica Deleon                                          Trnscrd Date/Time/By:  (0020) : By: [email or web address]           Orig Print D/T: S: 2019 (0023)  
                      PAGE  1                       Signed Report               
               CBC W/AUTO DIFF2019-09-10 23:57:00* 





                Test Item       Value           Reference Range    Comments

 

                WHITE BLOOD CELL (test code=WBC)    15.1 K/mm3      4.5-12.5         

 

                RED BLOOD CELL (test code=RBC)    4.06 mill/mm3    3.7-5.2          

 

                HEMOGLOBIN (test code=HGB)    12.8 gram/dL    11.5-15.5        

 

                HEMATOCRIT (test code=HCT)    38.6 %          36.0-46.0        

 

                MEAN CELL VOLUME (test code=MCV)    95.1 fL         80-98            

 

                MEAN CELL HGB (test code=MCH)    31.5 picogram    27.0-33.0        

 

                MEAN CELL HGB CONCETRATION (test code=MCHC)    33.2 gram/dL    33.0-36.0        

 

                RED CELL DISTRIBUTION WIDTH (test code=RDW)    13.4 %          11.6-16.2        

 

                RED CELL DISTRIBUTION WIDTH SD (test code=RDW-SD)    47.0 fL         37.0-51.0        

 

                PLATELET COUNT (test code=PLT)    211 K/mm3       150-450          

 

                MEAN PLATELET VOLUME (test code=MPV)    9.7 fL          6.7-11.0         

 

                NEUTROPHIL % (test code=NT%)    84.1 %          39.0-69.0        

 

                IMMATURE GRANULOCYTE % (test code=IG%)    1.0 %           0.0-5.0          

 

                LYMPHOCYTE % (test code=LY%)    6.1 %           25.0-55.0        

 

                MONOCYTE % (test code=MO%)    8.4 %           0.0-10.0         

 

                EOSINOPHIL % (test code=EO%)    0.1 %           0.0-5.0          

 

                BASOPHIL % (test code=BA%)    0.3 %           0.0-1.0          

 

                NUCLEATED RBC % (test code=NRBC%)    0.0 %           0-0              

 

                NEUTROPHIL # (test code=NT#)    12.69 K/mm3     1.8-7.7          

 

                IMMATURE GRANULOCYTE # (test code=IG#)    0.15 x10 3/uL    0-0.03           

 

                LYMPHOCYTE # (test code=LY#)    0.92 K/mm3      1.0-5.0          

 

                MONOCYTE # (test code=MO#)    1.27 K/mm3      0-0.8            

 

                EOSINOPHIL # (test code=EO#)    0.01 K/mm3      0.0-0.5          

 

                BASOPHIL # (test code=BA#)    0.05 K/mm3      0.0-0.2          

 

                NUCLEATED RBC # (test code=NRBC#)    0.00 K/mm3      0.0-0.1          

 

                MANUAL DIFF REQUIRED (test code=MDIFF)    NO                               





CBC W/AUTO DIFF2019-09-10 23:55:00* 





                Test Item       Value           Reference Range    Comments

 

                WHITE BLOOD CELL (test code=WBC)     K/mm3          4.5-12.5         

 

                RED BLOOD CELL (test code=RBC)     mill/mm3       3.7-5.2          

 

                HEMOGLOBIN (test code=HGB)    12.8 gram/dL    11.5-15.5        

 

                HEMATOCRIT (test code=HCT)    38.6 %          36.0-46.0        

 

                MEAN CELL VOLUME (test code=MCV)     fL             80-98            

 

                MEAN CELL HGB (test code=MCH)     picogram       27.0-33.0        

 

                MEAN CELL HGB CONCETRATION (test code=MCHC)     gram/dL        33.0-36.0        

 

                RED CELL DISTRIBUTION WIDTH (test code=RDW)     %              11.6-16.2        

 

                RED CELL DISTRIBUTION WIDTH SD (test code=RDW-SD)     fL             37.0-51.0        

 

                PLATELET COUNT (test code=PLT)     K/mm3          150-450          

 

                MEAN PLATELET VOLUME (test code=MPV)     fL             6.7-11.0         

 

                NEUTROPHIL % (test code=NT%)     %              39.0-69.0        

 

                IMMATURE GRANULOCYTE % (test code=IG%)     %              0.0-5.0          

 

                LYMPHOCYTE % (test code=LY%)     %              25.0-55.0        

 

                MONOCYTE % (test code=MO%)     %              0.0-10.0         

 

                EOSINOPHIL % (test code=EO%)     %              0.0-5.0          

 

                BASOPHIL % (test code=BA%)     %              0.0-1.0          

 

                NEUTROPHIL # (test code=NT#)     K/mm3          1.8-7.7          

 

                LYMPHOCYTE # (test code=LY#)     K/mm3          1.0-5.0          

 

                MONOCYTE # (test code=MO#)     K/mm3          0-0.8            

 

                EOSINOPHIL # (test code=EO#)     K/mm3          0.0-0.5          

 

                BASOPHIL # (test code=BA#)     K/mm3          0.0-0.2          





POC LACTIC ACID2019-09-10 23:44:00* 





                Test Item       Value           Reference Range    Comments

 

                POC LACTIC ACID (test code=POCLAC)    0.89 MMOL/L     0.4-2.2          





- XR FOREARM 2 VIEWS [id number] 21:04:00 FAX: Mk Faustin 
[phone]    [place]: B   St: PRE----------
---------------------------------------------------------------------  Name:   LINDA GUTIERREZ                    [facility]                     : [birth year]  Age/S: 64/F           [address] [facility]                Unit #: [id number]    
 Loc: DANIELA Toscano  [id number]              Phys: Mk Avila MD
                                              Acct: [id number] Dis Date:      
        Status: PRE ER                                 PHONE #: [phone]    
Exam Date:     2019                   FAX #: [phone]     
Reason: right forearm pain on ulnar side                   EXAMS:               
                               CPT CODE:      [id number] XR FOREARM 2 VIEWS RT   
                  [id number]                    HISTORY: Pain on the ulnar side.     
         COMPARISON: None available.               2 views of the right forearm:
              Nondisplaced transverse fracture of the radial head.  No joint 
fluid       is visible.  No AVN of the scaphoid or the lunate bones.  Narrowed  
    wrist joint.  Soft tissues appear within normal limits.                 I
MPRESSION:                   Nondisplaced transverse fracture suspected along th
e radial head.          Correlate clinically.  No significant joint fluid visibl
e.          ** Electronically Signed by PERI Sexton on 2019 at  
**                      Reported and signed by: Magdiel Sexton M.D.              
           CC: Mk Aivla MD                                            
                                                                      [facility]
gist: DEMI FERRIS RT (R); ...                     Trnscrd Date/Time/B
y: 2019 () : By: [email or web address]           Orig Print D/T: S: 2019 ()                         PAGE  1                       Signed Report

## 2019-10-07 NOTE — DIAGNOSTIC IMAGING REPORT
EXAMINATION:  CHEST 2 VIEWS    



INDICATION: Pain, shortness breath and swelling.



COMPARISON:  None

     

FINDINGS:

TUBES and LINES:  None.



LUNGS:  Lungs are well inflated. Mild bilateral interstitial edema. There is

mild prominence of the central pulmonary vasculature, consistent with pulmonary

venous congestion. Bibasilar subsegmental atelectasis.



PLEURA:  No pleural effusion or pneumothorax.



HEART AND MEDIASTINUM:  Cardiac size is mildly enlarged. Prominence of the

pulmonary charles bilaterally.



BONES AND SOFT TISSUES:  No acute osseous lesion.     



UPPER ABDOMEN: No free air under the diaphragm.    



IMPRESSION: 

Mild pulmonary venous congestion. 





Signed by: Dr. AMADOR Blankenship M.D. on 10/7/2019 11:27 PM

## 2019-10-08 VITALS — SYSTOLIC BLOOD PRESSURE: 132 MMHG | DIASTOLIC BLOOD PRESSURE: 64 MMHG

## 2019-10-08 VITALS — SYSTOLIC BLOOD PRESSURE: 125 MMHG | DIASTOLIC BLOOD PRESSURE: 64 MMHG

## 2019-10-08 VITALS — DIASTOLIC BLOOD PRESSURE: 55 MMHG | SYSTOLIC BLOOD PRESSURE: 111 MMHG

## 2019-10-08 VITALS — SYSTOLIC BLOOD PRESSURE: 121 MMHG | DIASTOLIC BLOOD PRESSURE: 62 MMHG

## 2019-10-08 VITALS — DIASTOLIC BLOOD PRESSURE: 60 MMHG | SYSTOLIC BLOOD PRESSURE: 122 MMHG

## 2019-10-08 VITALS — DIASTOLIC BLOOD PRESSURE: 56 MMHG | SYSTOLIC BLOOD PRESSURE: 116 MMHG

## 2019-10-08 VITALS — DIASTOLIC BLOOD PRESSURE: 58 MMHG | SYSTOLIC BLOOD PRESSURE: 131 MMHG

## 2019-10-08 VITALS — SYSTOLIC BLOOD PRESSURE: 125 MMHG | DIASTOLIC BLOOD PRESSURE: 94 MMHG

## 2019-10-08 VITALS — DIASTOLIC BLOOD PRESSURE: 62 MMHG | SYSTOLIC BLOOD PRESSURE: 122 MMHG

## 2019-10-08 VITALS — DIASTOLIC BLOOD PRESSURE: 55 MMHG | SYSTOLIC BLOOD PRESSURE: 128 MMHG

## 2019-10-08 VITALS — SYSTOLIC BLOOD PRESSURE: 117 MMHG | DIASTOLIC BLOOD PRESSURE: 62 MMHG

## 2019-10-08 VITALS — SYSTOLIC BLOOD PRESSURE: 131 MMHG | DIASTOLIC BLOOD PRESSURE: 60 MMHG

## 2019-10-08 VITALS — SYSTOLIC BLOOD PRESSURE: 101 MMHG | DIASTOLIC BLOOD PRESSURE: 57 MMHG

## 2019-10-08 VITALS — SYSTOLIC BLOOD PRESSURE: 124 MMHG | DIASTOLIC BLOOD PRESSURE: 66 MMHG

## 2019-10-08 VITALS — SYSTOLIC BLOOD PRESSURE: 128 MMHG | DIASTOLIC BLOOD PRESSURE: 69 MMHG

## 2019-10-08 VITALS — DIASTOLIC BLOOD PRESSURE: 83 MMHG | SYSTOLIC BLOOD PRESSURE: 128 MMHG

## 2019-10-08 VITALS — SYSTOLIC BLOOD PRESSURE: 135 MMHG | DIASTOLIC BLOOD PRESSURE: 80 MMHG

## 2019-10-08 VITALS — SYSTOLIC BLOOD PRESSURE: 129 MMHG | DIASTOLIC BLOOD PRESSURE: 94 MMHG

## 2019-10-08 VITALS — DIASTOLIC BLOOD PRESSURE: 62 MMHG | SYSTOLIC BLOOD PRESSURE: 121 MMHG

## 2019-10-08 VITALS — SYSTOLIC BLOOD PRESSURE: 112 MMHG | DIASTOLIC BLOOD PRESSURE: 57 MMHG

## 2019-10-08 VITALS — DIASTOLIC BLOOD PRESSURE: 61 MMHG | SYSTOLIC BLOOD PRESSURE: 113 MMHG

## 2019-10-08 VITALS — DIASTOLIC BLOOD PRESSURE: 61 MMHG | SYSTOLIC BLOOD PRESSURE: 109 MMHG

## 2019-10-08 VITALS — SYSTOLIC BLOOD PRESSURE: 108 MMHG | DIASTOLIC BLOOD PRESSURE: 60 MMHG

## 2019-10-08 LAB
ALBUMIN SERPL-MCNC: 3.3 G/DL (ref 3.5–5)
ALBUMIN/GLOB SERPL: 1 {RATIO} (ref 0.8–2)
ALP SERPL-CCNC: 82 IU/L (ref 40–150)
ALT SERPL-CCNC: 15 IU/L (ref 0–55)
ANION GAP SERPL CALC-SCNC: 13.3 MMOL/L (ref 8–16)
BASE EXCESS BLDA CALC-SCNC: 1 MMOL/L (ref -2–3)
BUN SERPL-MCNC: 9 MG/DL (ref 7–26)
BUN/CREAT SERPL: 12 (ref 6–25)
CALCIUM SERPL-MCNC: 10.1 MG/DL (ref 8.4–10.2)
CHLORIDE SERPL-SCNC: 102 MMOL/L (ref 98–107)
CK MB SERPL-MCNC: 1.3 NG/ML (ref 0–5)
CK MB SERPL-MCNC: 2.7 NG/ML (ref 0–5)
CK MB SERPL-MCNC: 3.7 NG/ML (ref 0–5)
CK SERPL-CCNC: 278 IU/L (ref 29–168)
CK SERPL-CCNC: 39 IU/L (ref 29–168)
CK SERPL-CCNC: 52 IU/L (ref 29–168)
CO2 SERPL-SCNC: 26 MMOL/L (ref 22–29)
EGFRCR SERPLBLD CKD-EPI 2021: > 60 ML/MIN (ref 60–?)
GLOBULIN PLAS-MCNC: 3.4 G/DL (ref 2.3–3.5)
GLUCOSE SERPLBLD-MCNC: 134 MG/DL (ref 74–118)
HCO3 BLDA-SCNC: 27 MMOL/L (ref 23–28)
PCO2 BLDA: 46 MMHG (ref 41–51)
PCO2 BLDA: 71 MMHG (ref 80–105)
PH BLDA: 7.37 [PH] (ref 7.31–7.41)
POTASSIUM SERPL-SCNC: 4.3 MMOL/L (ref 3.5–5.1)
SAO2 % BLDA: 93 % (ref 95–98)
SODIUM SERPL-SCNC: 137 MMOL/L (ref 136–145)

## 2019-10-08 RX ADMIN — BUPROPION HYDROCHLORIDE SCH MG: 150 TABLET, FILM COATED ORAL at 09:04

## 2019-10-08 RX ADMIN — METHYLPREDNISOLONE SODIUM SUCCINATE SCH MG: 40 INJECTION, POWDER, LYOPHILIZED, FOR SOLUTION INTRAMUSCULAR; INTRAVENOUS at 09:05

## 2019-10-08 RX ADMIN — IPRATROPIUM BROMIDE AND ALBUTEROL SULFATE SCH ML: .5; 2.5 SOLUTION RESPIRATORY (INHALATION) at 19:15

## 2019-10-08 RX ADMIN — IPRATROPIUM BROMIDE AND ALBUTEROL SULFATE SCH ML: .5; 2.5 SOLUTION RESPIRATORY (INHALATION) at 02:15

## 2019-10-08 RX ADMIN — FLUTICASONE PROPIONATE AND SALMETEROL SCH EA: 50; 250 POWDER RESPIRATORY (INHALATION) at 09:00

## 2019-10-08 RX ADMIN — NICOTINE SCH MG: 21 PATCH, EXTENDED RELEASE TRANSDERMAL at 15:07

## 2019-10-08 RX ADMIN — AMLODIPINE BESYLATE SCH MG: 10 TABLET ORAL at 10:37

## 2019-10-08 RX ADMIN — IPRATROPIUM BROMIDE AND ALBUTEROL SULFATE SCH ML: .5; 2.5 SOLUTION RESPIRATORY (INHALATION) at 07:06

## 2019-10-08 RX ADMIN — CITALOPRAM HYDROBROMIDE SCH MG: 20 TABLET, FILM COATED ORAL at 09:04

## 2019-10-08 RX ADMIN — FLUTICASONE PROPIONATE AND SALMETEROL SCH EA: 50; 250 POWDER RESPIRATORY (INHALATION) at 19:15

## 2019-10-08 RX ADMIN — Medication SCH MG: at 11:03

## 2019-10-08 RX ADMIN — IPRATROPIUM BROMIDE AND ALBUTEROL SULFATE SCH ML: .5; 2.5 SOLUTION RESPIRATORY (INHALATION) at 12:13

## 2019-10-08 RX ADMIN — Medication SCH MG: at 20:58

## 2019-10-08 RX ADMIN — IPRATROPIUM BROMIDE AND ALBUTEROL SULFATE SCH ML: .5; 2.5 SOLUTION RESPIRATORY (INHALATION) at 16:41

## 2019-10-08 RX ADMIN — IPRATROPIUM BROMIDE AND ALBUTEROL SULFATE SCH ML: .5; 2.5 SOLUTION RESPIRATORY (INHALATION) at 22:45

## 2019-10-08 RX ADMIN — METHYLPREDNISOLONE SODIUM SUCCINATE SCH MG: 40 INJECTION, POWDER, LYOPHILIZED, FOR SOLUTION INTRAMUSCULAR; INTRAVENOUS at 20:58

## 2019-10-08 RX ADMIN — PANTOPRAZOLE SODIUM SCH MG: 40 TABLET, DELAYED RELEASE ORAL at 09:05

## 2019-10-08 NOTE — CONSULTATION
DATE OF CONSULTATION:  

 

Pulmonary Critical Care Consultation 

 

HISTORY OF PRESENT ILLNESS:  The patient has a history of COPD.  She uses Advair at home

as well as rescue inhaler.  She does not have any oxygen at home.  She does not know of

any prior cardiac problems. 

 

The patient reports worsening dyspnea and congestion.  She notes a cough productive of

small amounts of phlegm.  She also reports some leg edema.  She has no chest pain.  She

is not having any nausea or vomiting. 

 

PAST SURGICAL HISTORY:  Status post cholecystectomy.

 

PAST MEDICAL HISTORY:  

1. COPD.

2. Hypertension.

 

SOCIAL HISTORY:  The patient just quit smoking upon arriving to the hospital.  She is

not an active drinker. 

 

ALLERGIES:  THERE ARE NO KNOWN DRUG ALLERGIES.

 

FAMILY HISTORY:  Family history is noncontributory.

 

REVIEW OF SYSTEMS:

CONSTITUTIONAL:  The patient is afebrile.  She has no headache.  She has no neck pain.

She is not complaining of any facial swelling or erythema. 

CARDIAC:  Reveals regular rate and rhythm with a normal S1, S2.  There are no murmurs or

rubs.  There are rhonchorous breath sounds bilaterally.  There is no wheezing. 

ABDOMEN:  Soft, nontender.  There is no rebound or guarding. 

EXTREMITIES:  Show 2+ leg edema. 

NEUROLOGIC:  Shows no focal abnormalities.

 

LABORATORY DATA:  The BUN to creatinine ratio is 9 to 0.77.  The other electrolytes are

within normal limits.  The white blood cell count is 9.4 and hemoglobin is 13.  The

platelet count is 217.  ABG is 7.37 and the CO2 is 46 and the O2 71.  bicarb is 27. 

 

RADIOGRAPHIC DATA:  Chest x-ray shows mild pulmonary venous congestion.

 

IMPRESSION:  

1. Acute-on-chronic respiratory failure.

2. Acute-on-chronic heart failure.

3. Chronic obstructive pulmonary disease.

4. Hypertension.

 

PLAN:  

1. The patient will be started on Solu-Medrol along with bronchodilators.  The patient

will also receive IV antibiotics. 

2. Lasix IV now.

3. Echocardiogram and Cardiology evaluation.

4. Venous duplex.

5. Swallowing evaluation.

 

 

 

 

______________________________

David Dean MD [facility]

D:  10/08/2019 07:52:04

T:  10/08/2019 13:52:20

Job #:  [id number]

## 2019-10-08 NOTE — NUR
WOUNDCARE CONSULT FOR 63 YO FEMALE  HX OF COPD AND RESP DISTRESS UPON SKIN ASSESSMENT NO 
AREAS OF NON BLANCHABLE REDNESS NOTED ANY LONGER

NURSING TO CONTINUE TO PROTECT AND OFFLOAD PATIENT FOLLOWING MODERATE PUP PROTOCOL 

NURSING TO RECONSULT WOUNDCARE FOR ANY FUTURE WOUND OR SKIN MAINTENANCE CONCERNS

## 2019-10-08 NOTE — NUR
Nutrition Screen Note



RD Recommendation for Physician: 

- Continue current diet



Plan of Care: RD following, monitoring for tolerance and adequacy



Nutrition reason for involvement:

Nutrition Risk Trigger- MST2



Primary Diagnose(s): COPD, hypoxia, respiratory distress



PMH: COPD, HTN, cholecystectomy



Ht: 64 in 

Wt: 186.19 lb

BMI: 32 kg/m2

IBW: 120 lb



RD Assessment:

(10/8) 64 YOF admitted for respiratory distress, evaluated today 2/2 MST2 screen. Pt 
discussed during am rounds, per RN pt with hx of schizoaffective disorder. Unable to obtain 
hx from pt at time of attempted visits, pt sleeping and then pt receiving breathing tx. Per 
RN pt passed BSE this am and pt eating 100% of meals today. RN denies any GI distress. Pt 
appears well nourished. Chart reviewed. Labs and meds reviewed. Will monitor and continue to 
follow. 



Current Diet: Cardiac



Malnutrition Evaluation (10/8/19)

The patient does not meet criteria for a specified degree of malnutrition at this time. Will 
re-evaluate at follow-up as appropriate. 

Unable to evaluate at this time.



Diet Education Needs Assessment:

Diet education not indicated at this time.





Nutrition Care Level: Low 





Signed: Ange Quezada RD, LD, [facility]

## 2019-10-08 NOTE — NUR
Pt stated burning with IV doxycycline, iv flushed, patent, pharmacy was consulted.  Called 
and spoke with Dr DANAY Dean.  Order to switch to PO doxycycline received.

## 2019-10-08 NOTE — HISTORY AND PHYSICAL
HISTORY OF PRESENT ILLNESS:  A 64-year-old female with past medical history positive for

COPD, hypertension, came from assisted living, complaining of shortness of breath.  She

was found to have acute respiratory failure.  She was admitted to intensive care unit.

She was found to have chronic obstructive pulmonary disease exacerbation, acute

diastolic congestive heart failure.  The patient is sleeping comfortable right now. 

 

PAST MEDICAL HISTORY:  The patient has a history of chronic obstructive pulmonary

disease, hypertension, and congestive heart failure. 

 

REVIEW OF SYSTEMS:

I cannot do review of systems because she is sleeping.  She has a deep sleep right now.

 

ALLERGIES:  NOT ALLERGIC TO ANY MEDICATION.

 

PAST SOCIAL HISTORY:  She smokes.  She does not drink.

 

PHYSICAL EXAMINATION:

VITAL SIGNS:  The blood pressure 128/55, temperature 38.6, heart rate 71 per minute,

respiratory rate is 18 per minute, and oxygen saturation 97%. 

HEART:  Showed regular rhythm.  Normal S1, S2 sound. 

LUNGS:  Show decreased breath sounds bilaterally. 

ABDOMEN:  Soft. 

EXTREMITIES:  Showed 1+ pedal edema, which is slowly resolving.

LABORATORY DATA:  On the BMP; sodium 137, potassium 4.3, chloride 102, CO2 of 26, BUN 9,

creatinine 0.77, glucose 134.  On the CBC, white blood count 9.4, hemoglobin 13.0,

hematocrit 39.0, and platelet count 217,000.   AST 9, ALT 15, total bilirubin 0.3,

alkaline phosphatase 82. 

 

EKG show normal sinus rhythm, left axis deviation.  No evidence of any ST-segment

elevation or depression. 

 

Bilateral Doppler upper extremities show no evidence of DVT. 

 

Chest x-ray showed evidence of congestive heart failure.

 

IMPRESSION:  

1. Acute respiratory failure, secondary to combination of chronic obstructive pulmonary

disease and acute-on-chronic diastolic congestive heart failure. 

2. Chronic obstructive pulmonary disease exacerbation.

3. Acute-on-chronic systolic congestive heart failure.

4. Hypertension.

5. History of smoking.

 

PLAN OF TREATMENT:  Continue oxygen.  Continue albuterol and Atrovent q.4 hours.

Continue doxycycline 100 mg IV twice a day.  The patient got the pneumonia vaccine also.

 Continue albuterol around the clock q.4 hours.  Continue citalopram 20 mg daily,

amlodipine 10 mg daily, Solu-Medrol 40 mg IV twice a day, Lipitor 20 mg daily, Protonix

40 mg daily, bupropion 150 mg daily, __________ continue monitoring BUN, creatinine, and

electrolytes.  Dr. Nieto has been consulted from the Cardiology point of view, Dr. David Dean from the 

Pulmonary point of view.  __________ intensive care unit.  Labs have been reviewed.

Medication list has been reviewed.  Case has been discussed with nursing staff. 

 

 

 

 

______________________________

MD AL Simpson/NAGI

D:  10/08/2019 14:19:38

T:  10/08/2019 19:37:29

Job #:  [id number]

## 2019-10-09 VITALS — DIASTOLIC BLOOD PRESSURE: 59 MMHG | SYSTOLIC BLOOD PRESSURE: 121 MMHG

## 2019-10-09 VITALS — SYSTOLIC BLOOD PRESSURE: 129 MMHG | DIASTOLIC BLOOD PRESSURE: 62 MMHG

## 2019-10-09 VITALS — SYSTOLIC BLOOD PRESSURE: 123 MMHG | DIASTOLIC BLOOD PRESSURE: 56 MMHG

## 2019-10-09 VITALS — SYSTOLIC BLOOD PRESSURE: 112 MMHG | DIASTOLIC BLOOD PRESSURE: 57 MMHG

## 2019-10-09 VITALS — SYSTOLIC BLOOD PRESSURE: 104 MMHG | DIASTOLIC BLOOD PRESSURE: 52 MMHG

## 2019-10-09 VITALS — SYSTOLIC BLOOD PRESSURE: 112 MMHG | DIASTOLIC BLOOD PRESSURE: 54 MMHG

## 2019-10-09 VITALS — DIASTOLIC BLOOD PRESSURE: 55 MMHG | SYSTOLIC BLOOD PRESSURE: 128 MMHG

## 2019-10-09 VITALS — SYSTOLIC BLOOD PRESSURE: 125 MMHG | DIASTOLIC BLOOD PRESSURE: 55 MMHG

## 2019-10-09 VITALS — DIASTOLIC BLOOD PRESSURE: 56 MMHG | SYSTOLIC BLOOD PRESSURE: 111 MMHG

## 2019-10-09 VITALS — SYSTOLIC BLOOD PRESSURE: 117 MMHG | DIASTOLIC BLOOD PRESSURE: 58 MMHG

## 2019-10-09 VITALS — DIASTOLIC BLOOD PRESSURE: 63 MMHG | SYSTOLIC BLOOD PRESSURE: 115 MMHG

## 2019-10-09 VITALS — DIASTOLIC BLOOD PRESSURE: 59 MMHG | SYSTOLIC BLOOD PRESSURE: 118 MMHG

## 2019-10-09 VITALS — SYSTOLIC BLOOD PRESSURE: 101 MMHG | DIASTOLIC BLOOD PRESSURE: 52 MMHG

## 2019-10-09 VITALS — DIASTOLIC BLOOD PRESSURE: 48 MMHG | SYSTOLIC BLOOD PRESSURE: 107 MMHG

## 2019-10-09 VITALS — SYSTOLIC BLOOD PRESSURE: 104 MMHG | DIASTOLIC BLOOD PRESSURE: 50 MMHG

## 2019-10-09 VITALS — SYSTOLIC BLOOD PRESSURE: 124 MMHG | DIASTOLIC BLOOD PRESSURE: 61 MMHG

## 2019-10-09 VITALS — SYSTOLIC BLOOD PRESSURE: 116 MMHG | DIASTOLIC BLOOD PRESSURE: 58 MMHG

## 2019-10-09 VITALS — DIASTOLIC BLOOD PRESSURE: 61 MMHG | SYSTOLIC BLOOD PRESSURE: 124 MMHG

## 2019-10-09 VITALS — DIASTOLIC BLOOD PRESSURE: 85 MMHG | SYSTOLIC BLOOD PRESSURE: 127 MMHG

## 2019-10-09 VITALS — DIASTOLIC BLOOD PRESSURE: 59 MMHG | SYSTOLIC BLOOD PRESSURE: 125 MMHG

## 2019-10-09 VITALS — SYSTOLIC BLOOD PRESSURE: 123 MMHG | DIASTOLIC BLOOD PRESSURE: 70 MMHG

## 2019-10-09 LAB
ALBUMIN SERPL-MCNC: 2.9 G/DL (ref 3.5–5)
ALBUMIN/GLOB SERPL: 0.9 {RATIO} (ref 0.8–2)
ALP SERPL-CCNC: 69 IU/L (ref 40–150)
ALT SERPL-CCNC: 11 IU/L (ref 0–55)
ANION GAP SERPL CALC-SCNC: 13.2 MMOL/L (ref 8–16)
BASOPHILS # BLD AUTO: 0 10*3/UL (ref 0–0.1)
BASOPHILS NFR BLD AUTO: 0 % (ref 0–1)
BUN SERPL-MCNC: 16 MG/DL (ref 7–26)
BUN/CREAT SERPL: 21 (ref 6–25)
CALCIUM SERPL-MCNC: 9.7 MG/DL (ref 8.4–10.2)
CHLORIDE SERPL-SCNC: 102 MMOL/L (ref 98–107)
CK SERPL-CCNC: 34 IU/L (ref 29–168)
CO2 SERPL-SCNC: 26 MMOL/L (ref 22–29)
DEPRECATED NEUTROPHILS # BLD AUTO: 8.3 10*3/UL (ref 2.1–6.9)
EGFRCR SERPLBLD CKD-EPI 2021: > 60 ML/MIN (ref 60–?)
EOSINOPHIL # BLD AUTO: 0 10*3/UL (ref 0–0.4)
EOSINOPHIL NFR BLD AUTO: 0 % (ref 0–6)
ERYTHROCYTE [DISTWIDTH] IN CORD BLOOD: 13.1 % (ref 11.7–14.4)
GLOBULIN PLAS-MCNC: 3.1 G/DL (ref 2.3–3.5)
GLUCOSE SERPLBLD-MCNC: 151 MG/DL (ref 74–118)
HCT VFR BLD AUTO: 37.3 % (ref 34.2–44.1)
HGB BLD-MCNC: 12.1 G/DL (ref 12–16)
LYMPHOCYTES # BLD: 0.5 10*3/UL (ref 1–3.2)
LYMPHOCYTES NFR BLD AUTO: 5.4 % (ref 18–39.1)
MAGNESIUM SERPL-MCNC: 1.7 MG/DL (ref 1.3–2.1)
MCH RBC QN AUTO: 30.5 PG (ref 28–32)
MCHC RBC AUTO-ENTMCNC: 32.4 G/DL (ref 31–35)
MCV RBC AUTO: 94 FL (ref 81–99)
MONOCYTES # BLD AUTO: 0.3 10*3/UL (ref 0.2–0.8)
MONOCYTES NFR BLD AUTO: 3.1 % (ref 4.4–11.3)
NEUTS SEG NFR BLD AUTO: 91.1 % (ref 38.7–80)
PLATELET # BLD AUTO: 214 X10E3/UL (ref 140–360)
POTASSIUM SERPL-SCNC: 4.2 MMOL/L (ref 3.5–5.1)
RBC # BLD AUTO: 3.97 X10E6/UL (ref 3.6–5.1)
SODIUM SERPL-SCNC: 137 MMOL/L (ref 136–145)

## 2019-10-09 RX ADMIN — ATORVASTATIN CALCIUM SCH MG: 20 TABLET, FILM COATED ORAL at 05:34

## 2019-10-09 RX ADMIN — FLUTICASONE PROPIONATE AND SALMETEROL SCH EA: 50; 250 POWDER RESPIRATORY (INHALATION) at 19:10

## 2019-10-09 RX ADMIN — Medication SCH MG: at 08:10

## 2019-10-09 RX ADMIN — NICOTINE SCH MG: 21 PATCH, EXTENDED RELEASE TRANSDERMAL at 08:10

## 2019-10-09 RX ADMIN — PANTOPRAZOLE SODIUM SCH MG: 40 TABLET, DELAYED RELEASE ORAL at 08:10

## 2019-10-09 RX ADMIN — BUPROPION HYDROCHLORIDE SCH MG: 150 TABLET, FILM COATED ORAL at 08:10

## 2019-10-09 RX ADMIN — IPRATROPIUM BROMIDE AND ALBUTEROL SULFATE SCH ML: .5; 2.5 SOLUTION RESPIRATORY (INHALATION) at 19:10

## 2019-10-09 RX ADMIN — FLUTICASONE PROPIONATE AND SALMETEROL SCH EA: 50; 250 POWDER RESPIRATORY (INHALATION) at 07:40

## 2019-10-09 RX ADMIN — IPRATROPIUM BROMIDE AND ALBUTEROL SULFATE SCH ML: .5; 2.5 SOLUTION RESPIRATORY (INHALATION) at 22:35

## 2019-10-09 RX ADMIN — IPRATROPIUM BROMIDE AND ALBUTEROL SULFATE SCH ML: .5; 2.5 SOLUTION RESPIRATORY (INHALATION) at 23:40

## 2019-10-09 RX ADMIN — IPRATROPIUM BROMIDE AND ALBUTEROL SULFATE SCH ML: .5; 2.5 SOLUTION RESPIRATORY (INHALATION) at 11:40

## 2019-10-09 RX ADMIN — METHYLPREDNISOLONE SODIUM SUCCINATE SCH MG: 40 INJECTION, POWDER, LYOPHILIZED, FOR SOLUTION INTRAMUSCULAR; INTRAVENOUS at 21:35

## 2019-10-09 RX ADMIN — IPRATROPIUM BROMIDE AND ALBUTEROL SULFATE SCH ML: .5; 2.5 SOLUTION RESPIRATORY (INHALATION) at 07:40

## 2019-10-09 RX ADMIN — IPRATROPIUM BROMIDE AND ALBUTEROL SULFATE SCH ML: .5; 2.5 SOLUTION RESPIRATORY (INHALATION) at 02:30

## 2019-10-09 RX ADMIN — FUROSEMIDE SCH MG: 10 INJECTION, SOLUTION INTRAMUSCULAR; INTRAVENOUS at 18:58

## 2019-10-09 RX ADMIN — AMLODIPINE BESYLATE SCH MG: 10 TABLET ORAL at 08:10

## 2019-10-09 RX ADMIN — Medication PRN MG: at 16:00

## 2019-10-09 RX ADMIN — METHYLPREDNISOLONE SODIUM SUCCINATE SCH MG: 40 INJECTION, POWDER, LYOPHILIZED, FOR SOLUTION INTRAMUSCULAR; INTRAVENOUS at 08:09

## 2019-10-09 RX ADMIN — IPRATROPIUM BROMIDE AND ALBUTEROL SULFATE SCH ML: .5; 2.5 SOLUTION RESPIRATORY (INHALATION) at 14:25

## 2019-10-09 RX ADMIN — CITALOPRAM HYDROBROMIDE SCH MG: 20 TABLET, FILM COATED ORAL at 08:09

## 2019-10-09 RX ADMIN — Medication SCH MG: at 21:35

## 2019-10-09 NOTE — DIAGNOSTIC IMAGING REPORT
EXAMINATION:  CHEST SINGLE (PORTABLE)    



INDICATION: Pain, shortness breath and swelling.



COMPARISON:  10/07/2019

     

FINDINGS:

TUBES and LINES:  None.



LUNGS:  Lungs are well inflated. Mild bilateral interstitial edema. There is

mild prominence of the central pulmonary vasculature, consistent with pulmonary

venous congestion. Bibasilar subsegmental atelectasis.



PLEURA:  No pleural effusion or pneumothorax.



HEART AND MEDIASTINUM:  Cardiac size is mildly enlarged. Prominence of the

pulmonary charles bilaterally. Calcification of the arch. 



BONES AND SOFT TISSUES:  No acute osseous lesion.  



UPPER ABDOMEN: No free air under the diaphragm. 



IMPRESSION: 



No significant interval change.  Mild pulmonary venous congestion and

interstitial edema. 



Signed by: Dr. AMADOR Blankenship M.D. on 10/9/2019 6:38 AM

## 2019-10-09 NOTE — NUR
Report received KHURRAM Vilchis. Patient transferred from ICU to unit @ 2050 by bed. Denied pain 
and no SOB. Respiration even and unlabored with 2liters oxygen continued via nasal canula. 
Spo2 maintained 96%. V/S WNL. Redness and excoriations on sacrum and DTI on left buttocks 
noted. Bed in lower position,locked. Patient instructed to call for help as needed, 
verbalized and understand. Call bell within the reach. Will continue to monitor.

## 2019-10-09 NOTE — NUR
Pt did well on 2L NC today, patient still having cough. Dr. Tyler gave orders to move to 
the floor with telemetry. Dr. Tyler informed that palliative care would like to see 
patient to discuss with patient stratigies to prevent exacerbations and goals of care. Dr. Tyler gave the ok for palliative care to see patient. [facility] notified of referral. 
Patient complaining of R arm pain and headache. Dr. Tyler informed, orders for tylenol 
prn given. Will continue to monitor the patient.

## 2019-10-09 NOTE — CONSULTATION
DATE OF CONSULTATION:  10/08/2019

 

Cardiology Consultation 

 

REQUESTING PHYSICIAN:  David Tyler MD.

 

REASON FOR CONSULTATION:  Congestive heart failure.

 

HISTORY OF PRESENT ILLNESS:  This is a 64-year-old women with history of hyperlipidemia

and COPD, who presents with complaints of lower extremity edema and shortness of breath.

 She reports that she has been short of breath for the last week with lower extremity

swelling for the last 5 days.  She denies any chest pain or palpitations, but does

endorse occasional symptoms suggestive of paroxysmal nocturnal dyspnea.  Due to her

symptoms, she presented to the ER for further evaluation. 

 

REVIEW OF SYSTEMS:

Negative as per HPI.

 

PAST MEDICAL HISTORY:  

1. Hyperlipidemia.

2. COPD.

3. Hypertension.

 

PAST SURGICAL HISTORY:  

1. Cholecystectomy.

2. Tubal ligation.

 

ALLERGIES:  PLEASE SEE EMR.

 

MEDICATIONS:  Please see medication list.

 

SOCIAL HISTORY:  She reports she smokes 3-4 cigarettes a day.  Denies any alcohol or

illicit drugs. 

 

FAMILY HISTORY:  Denies family history of heart disease.

 

PHYSICAL EXAMINATION:

VITAL SIGNS:  Temperature 98.2 degrees, pulse 74, respiratory rate 22, blood pressure

125/64, and oxygen saturation 96% on 2 L nasal cannula. 

GENERAL:  Obese woman, no acute distress, well developed and well nourished. 

HEENT:  Normocephalic, atraumatic.  Pupils equal.  No scleral icterus. 

NECK:  Supple.  No thyromegaly or cervical lymphadenopathy.  No carotid bruits. LUNGS:

Wheezing bilaterally.  No crackles appreciated. 

CARDIOVASCULAR:  Normal rate.  Regular rhythm.  No murmur.  Normal S1, S2. 

ABDOMEN:  Soft, nontender. 

EXTREMITIES:  1 to 2+ pitting edema, left greater than right. 

NEUROLOGIC:  Nonfocal exam.

LABORATORY DATA:  WBC 9.48, hemoglobin 13, hematocrit 39, platelets 217.  Sodium 137,

potassium 4.3, chloride 102, CO2 of 26, BUN 9, creatinine 0.77.  Troponin less than

0.001.  BNP 27.6. 

 

EKG, normal sinus rhythm, left axis deviation, inferior infarct, age undetermined. 

 

Chest x-ray, mild pulmonary venous congestion.   

 

Telemetry, normal sinus rhythm.

 

IMPRESSION:  

1. Dyspnea.

2. Hypertension.

3. Hyperlipidemia.

4. Chronic obstructive pulmonary disease.

 

RECOMMENDATIONS:  Suspect the patient's dyspnea is a combination of COPD exacerbation as

well as acute-on-chronic diastolic heart failure.  The patient's BNP is within normal

range, but this can be falsely low in the setting of obesity.  Recommend IV diuresis.

Follow creatinine closely.  Monitor and replete electrolytes.  Treatment of COPD per

Pulmonary.  Note ordered for bilateral lower extremity venous Doppler.  We will review

the images once available.  Monitor the patient closely on telemetry.  Continue home

cardiac medications. 

 

Thank you for this consult.  We will continue to follow.

 

 

 

 

______________________________

Padmini Landers MD

 

ABS/MODL

D:  10/08/2019 17:14:28

T:  10/09/2019 00:27:39

Job #:  [id number]

## 2019-10-09 NOTE — NUR
ST NOTE:



Attempt Speech Therapy session for follow up with diet tolerance, pt with RN having personal 
care.  Will return at later time to complete follow up session.

## 2019-10-09 NOTE — PROGRESS NOTE
DATE:  10/09/2019

 

Cardiology Progress Note 

 

SUBJECTIVE:  The patient denies chest pain or shortness of breath.

 

OBJECTIVE:  VITAL SIGNS:  Temperature 98.6 degrees, pulse 82, respiratory rate 25, blood

pressure 115/63, and oxygen saturation 97% on 2 liters nasal cannula. 

GENERAL:  Awake, alert, in no acute distress. 

LUNGS:  Clear to auscultation bilaterally.  No wheezes or crackles. 

CARDIOVASCULAR:  Normal rate.  Regular rhythm.  No murmur.  Normal S1 and S2. 

ABDOMEN:  Soft and nontender. 

EXTREMITIES:  1 to 2+ pitting edema, left greater than right.

 

CARDIAC MEDICATIONS:  

1. Amlodipine 10 mg p.o. daily.

2. Atorvastatin 20 mg p.o. daily.

 

LABORATORY DATA:  CBC; WBC 9.09, hemoglobin 12.1, hematocrit 37.3, and platelets 214.

Sodium 137, potassium 4.2, chloride 102, CO2 of 26, BUN 16, and creatinine 0.75. 

 

TELEMETRY:  Normal sinus rhythm.

 

IMPRESSION:  

1. Dyspnea.

2. Hypertension.

3. Hyperlipidemia.

4. Chronic obstructive pulmonary disease.

5. Acute on chronic diastolic heart failure.

 

RECOMMENDATIONS:  Suspect the patient's dyspnea is a combination of COPD exacerbation as

well as acute on chronic diastolic heart failure.  The patient's BNP is within normal

limits, but this can be falsely low in the setting of obesity.  Gentle IV diuretics.

Monitor creatinine closely.  Replete electrolytes.  Treatment of COPD per primary.

Follow up the patient on telemetry.  Continue current cardiac medications. 

 

Thank you for this consult.  We will continue to follow.

 

 

 

 

______________________________

Padmini Landers MD

 

ABS/MODL

D:  10/09/2019 17:51:57

T:  10/09/2019 20:30:49

Job #:  [id number]

## 2019-10-09 NOTE — NUR
Patient had soft yellow BM, assisted to cleaned and changed diaper/linens/pads/gown at this 
time. Will continue to monitor.

## 2019-10-09 NOTE — PROGRESS NOTE
DATE:    

 

SUBJECTIVE:  The patient is feeling better.  She has less dyspnea.  She has less

congestion and less cough.  She has less leg swelling. 

 

PHYSICAL EXAMINATION:

VITAL SIGNS:  The patient is afebrile.  The blood pressure is 118/60, saturation is 98%.

 The pulse is 82. 

HEENT:  Shows no facial swelling or erythema. 

CARDIAC:  Reveals a regular rate and rhythm with normal S1 and S2.  There are no murmurs

or rubs heard. 

LUNGS:  Auscultation of lungs reveals clear breath sounds bilaterally.  There is no

wheezing. 

ABDOMEN:  Soft, nontender.  There is no rebound or guarding. 

EXTREMITIES:  Show 1 to 2+ leg edema.

IMPRESSION:  

1. Acute-on-chronic diastolic heart failure.

2. Acute-on-chronic respiratory failure.

3. Chronic obstructive pulmonary disease.

4. Hypertension.

5. Hyperlipidemia.

 

PLAN:  

1. Taper steroids.

2. Continue bronchodilators and oxygen.

3. Continue diuretics and cardiac regimen as per Cardiology.

4. Await results of venous duplex.

 

 

 

 

______________________________

David Dean MD

 

[facility]/NAGI

D:  10/09/2019 08:03:40

T:  10/09/2019 10:20:26

Job #:  [id number]

## 2019-10-09 NOTE — PROGRESS NOTE
DATE:  

 

Internal Medicine Progress Note 

 

SUBJECTIVE:  The patient is doing better.

 

PHYSICAL EXAMINATION:

VITAL SIGNS:  Blood pressure 124/61, heart rate 103 per minute, respiratory rate is 24

per minute, and temperature 98.8. 

HEART:  Show regular rhythm.  Normal S1 and S2 sound. 

LUNGS:  Show decreased breath sounds bilaterally. 

ABDOMEN:  Soft.

LABORATORY DATA:  On the BMP; sodium 137, potassium 4.2, chloride 102, CO2 26, BUN 16,

creatinine 0.75, and glucose 151.  On the CBC, white blood count 9.09, hemoglobin 12.1,

hematocrit 37.3, and platelet count 214,000.  AST 7, ALT 11, total bilirubin 0.1, and

alkaline phosphatase 69. 

 

FINAL IMPRESSION:  

1. Acute respiratory failure secondary to chronic obstructive pulmonary disease

exacerbation. 

2. Chronic obstructive pulmonary disease exacerbation.

3. Acute-on-chronic diastolic congestive heart failure.

4. Hypertension.

 

PLAN OF TREATMENT:  Continue albuterol and Atrovent q.4 hours.  Continue with albuterol

q.4 hours around the clock.  Continue amlodipine 10 mg daily, Protonix 40 mg daily,

Solu-Medrol 30 mg IV twice a day, Lipitor 20 mg daily.  Continue Serevent twice a day.

Continue bupropion 150 mg daily, doxycycline 100 mg IV twice a day, citalopram 20 mg

daily, Nicoderm patch 21 mg daily.  The patient is advised to quit smoking also.  Labs

have been reviewed.  Consultation reports are on the chart review also.  Discussed with

the nurse at the bedside.  Time spent 55 minutes. 

 

 

 

 

______________________________

MD AL Simpson/NAGI

D:  10/09/2019 14:13:02

T:  10/09/2019 17:24:37

Job #:  [id number]

## 2019-10-10 VITALS — SYSTOLIC BLOOD PRESSURE: 117 MMHG | DIASTOLIC BLOOD PRESSURE: 68 MMHG

## 2019-10-10 VITALS — DIASTOLIC BLOOD PRESSURE: 61 MMHG | SYSTOLIC BLOOD PRESSURE: 118 MMHG

## 2019-10-10 VITALS — SYSTOLIC BLOOD PRESSURE: 122 MMHG | DIASTOLIC BLOOD PRESSURE: 71 MMHG

## 2019-10-10 VITALS — SYSTOLIC BLOOD PRESSURE: 125 MMHG | DIASTOLIC BLOOD PRESSURE: 59 MMHG

## 2019-10-10 VITALS — SYSTOLIC BLOOD PRESSURE: 136 MMHG | DIASTOLIC BLOOD PRESSURE: 59 MMHG

## 2019-10-10 VITALS — SYSTOLIC BLOOD PRESSURE: 111 MMHG | DIASTOLIC BLOOD PRESSURE: 77 MMHG

## 2019-10-10 VITALS — DIASTOLIC BLOOD PRESSURE: 59 MMHG | SYSTOLIC BLOOD PRESSURE: 136 MMHG

## 2019-10-10 LAB
ALBUMIN SERPL-MCNC: 3 G/DL (ref 3.5–5)
ALBUMIN/GLOB SERPL: 1 {RATIO} (ref 0.8–2)
ALP SERPL-CCNC: 71 IU/L (ref 40–150)
ALT SERPL-CCNC: 14 IU/L (ref 0–55)
ANION GAP SERPL CALC-SCNC: 13.3 MMOL/L (ref 8–16)
BASOPHILS # BLD AUTO: 0 10*3/UL (ref 0–0.1)
BASOPHILS NFR BLD AUTO: 0.1 % (ref 0–1)
BUN SERPL-MCNC: 14 MG/DL (ref 7–26)
BUN/CREAT SERPL: 19 (ref 6–25)
CALCIUM SERPL-MCNC: 9.6 MG/DL (ref 8.4–10.2)
CHLORIDE SERPL-SCNC: 99 MMOL/L (ref 98–107)
CO2 SERPL-SCNC: 27 MMOL/L (ref 22–29)
DEPRECATED NEUTROPHILS # BLD AUTO: 8 10*3/UL (ref 2.1–6.9)
EGFRCR SERPLBLD CKD-EPI 2021: > 60 ML/MIN (ref 60–?)
EOSINOPHIL # BLD AUTO: 0 10*3/UL (ref 0–0.4)
EOSINOPHIL NFR BLD AUTO: 0.1 % (ref 0–6)
ERYTHROCYTE [DISTWIDTH] IN CORD BLOOD: 13.4 % (ref 11.7–14.4)
GLOBULIN PLAS-MCNC: 3 G/DL (ref 2.3–3.5)
GLUCOSE SERPLBLD-MCNC: 120 MG/DL (ref 74–118)
HCT VFR BLD AUTO: 36.7 % (ref 34.2–44.1)
HGB BLD-MCNC: 11.8 G/DL (ref 12–16)
LYMPHOCYTES # BLD: 0.7 10*3/UL (ref 1–3.2)
LYMPHOCYTES NFR BLD AUTO: 7.7 % (ref 18–39.1)
MCH RBC QN AUTO: 30.6 PG (ref 28–32)
MCHC RBC AUTO-ENTMCNC: 32.2 G/DL (ref 31–35)
MCV RBC AUTO: 95.1 FL (ref 81–99)
MONOCYTES # BLD AUTO: 0.3 10*3/UL (ref 0.2–0.8)
MONOCYTES NFR BLD AUTO: 3 % (ref 4.4–11.3)
NEUTS SEG NFR BLD AUTO: 88.2 % (ref 38.7–80)
PLATELET # BLD AUTO: 215 X10E3/UL (ref 140–360)
POTASSIUM SERPL-SCNC: 4.3 MMOL/L (ref 3.5–5.1)
RBC # BLD AUTO: 3.86 X10E6/UL (ref 3.6–5.1)
SODIUM SERPL-SCNC: 135 MMOL/L (ref 136–145)

## 2019-10-10 RX ADMIN — Medication PRN MG: at 18:30

## 2019-10-10 RX ADMIN — IPRATROPIUM BROMIDE AND ALBUTEROL SULFATE SCH ML: .5; 2.5 SOLUTION RESPIRATORY (INHALATION) at 07:50

## 2019-10-10 RX ADMIN — IPRATROPIUM BROMIDE AND ALBUTEROL SULFATE SCH ML: .5; 2.5 SOLUTION RESPIRATORY (INHALATION) at 15:08

## 2019-10-10 RX ADMIN — BUPROPION HYDROCHLORIDE SCH MG: 150 TABLET, FILM COATED ORAL at 08:59

## 2019-10-10 RX ADMIN — IPRATROPIUM BROMIDE AND ALBUTEROL SULFATE SCH ML: .5; 2.5 SOLUTION RESPIRATORY (INHALATION) at 11:00

## 2019-10-10 RX ADMIN — Medication SCH MG: at 08:59

## 2019-10-10 RX ADMIN — Medication SCH MG: at 20:52

## 2019-10-10 RX ADMIN — Medication PRN MG: at 10:19

## 2019-10-10 RX ADMIN — IPRATROPIUM BROMIDE AND ALBUTEROL SULFATE SCH ML: .5; 2.5 SOLUTION RESPIRATORY (INHALATION) at 02:40

## 2019-10-10 RX ADMIN — PANTOPRAZOLE SODIUM SCH MG: 40 TABLET, DELAYED RELEASE ORAL at 08:59

## 2019-10-10 RX ADMIN — CITALOPRAM HYDROBROMIDE SCH MG: 20 TABLET, FILM COATED ORAL at 08:59

## 2019-10-10 RX ADMIN — IPRATROPIUM BROMIDE AND ALBUTEROL SULFATE SCH ML: .5; 2.5 SOLUTION RESPIRATORY (INHALATION) at 19:15

## 2019-10-10 RX ADMIN — ATORVASTATIN CALCIUM SCH MG: 20 TABLET, FILM COATED ORAL at 06:05

## 2019-10-10 RX ADMIN — METHYLPREDNISOLONE SODIUM SUCCINATE SCH MG: 40 INJECTION, POWDER, LYOPHILIZED, FOR SOLUTION INTRAMUSCULAR; INTRAVENOUS at 08:59

## 2019-10-10 RX ADMIN — FLUTICASONE PROPIONATE AND SALMETEROL SCH EA: 50; 250 POWDER RESPIRATORY (INHALATION) at 11:00

## 2019-10-10 RX ADMIN — AMLODIPINE BESYLATE SCH MG: 10 TABLET ORAL at 09:00

## 2019-10-10 RX ADMIN — NICOTINE SCH MG: 21 PATCH, EXTENDED RELEASE TRANSDERMAL at 08:59

## 2019-10-10 RX ADMIN — FLUTICASONE PROPIONATE AND SALMETEROL SCH EA: 50; 250 POWDER RESPIRATORY (INHALATION) at 19:15

## 2019-10-10 RX ADMIN — METHYLPREDNISOLONE SODIUM SUCCINATE SCH MG: 40 INJECTION, POWDER, LYOPHILIZED, FOR SOLUTION INTRAMUSCULAR; INTRAVENOUS at 20:52

## 2019-10-10 RX ADMIN — FUROSEMIDE SCH MG: 10 INJECTION, SOLUTION INTRAMUSCULAR; INTRAVENOUS at 08:59

## 2019-10-10 NOTE — PROGRESS NOTE
DATE:  10/10/2019

 

Cardiology Progress Note 

 

SUBJECTIVE:  The patient denies chest pain or shortness of breath.

 

OBJECTIVE:  VITAL SIGNS:  Temperature 98.7 degrees, pulse 85, respiratory rate 19, blood

pressure 136/59, and oxygen saturation 96% on 2 liters nasal cannula. 

GENERAL:  Awake, alert, in no acute distress. 

LUNGS:  Clear to auscultation bilaterally.  No wheezes or crackles. 

CARDIOVASCULAR:  Normal rate.  Regular rhythm.  No murmur.  Normal S1 and S2. 

ABDOMEN:  Soft and nontender. 

EXTREMITIES:  1 to 2+ pitting edema, left greater than right.

 

CARDIAC MEDICATIONS:  

1. Amlodipine 10 mg p.o. daily.

2. Furosemide 40 mg IV daily.

3. Atorvastatin 20 mg p.o. at bedtime.

 

LABORATORY DATA:  WBC 9.09, hemoglobin 11.8, hematocrit 36.7, and platelets 215.  Sodium

135, potassium 4.3, chloride 99, CO2 of 27, BUN 14, and creatinine 0.72. 

 

TELEMETRY:  Normal sinus rhythm.

 

IMPRESSION:  

1. Dyspnea.

2. Hypertension.

3. Hyperlipidemia.

4. Chronic obstructive pulmonary disease.

5. Acute on chronic diastolic heart failure.

 

RECOMMENDATIONS:  Suspect the patient's dyspnea is combination of COPD exacerbation as

well as acute on chronic diastolic heart failure.  Continue gentle IV diuretics.

Monitor creatinine closely.  Replete electrolytes.  Treatment of COPD per Pulmonary.

Maintain the patient on telemetry.  Continue current cardiac medications. 

 

Thank you for this consult.  We will continue to follow.

 

 

 

 

______________________________

Padmini Landers MD

 

ABS/MODL

D:  10/10/2019 12:52:23

T:  10/10/2019 14:09:04

Job #:  [id number]

## 2019-10-10 NOTE — PROGRESS NOTE
DATE:    

 

SUBJECTIVE:  The patient feels better.  She has less dyspnea and less cough.

 

PHYSICAL EXAMINATION:

VITAL SIGNS:  The patient is afebrile.  Vital signs are stable. 

HEENT:  Shows no facial swelling or erythema. 

CARDIAC:  Reveals a regular rate and rhythm with a normal S1 and S2.  There are no

murmurs or rubs. 

LUNGS:  Auscultation of lungs reveals rhonchorous breath sounds bilaterally.  There is

no wheezing. 

ABDOMEN:  Soft and nontender.  There is no rebound or guarding. 

EXTREMITIES:  Show no leg edema or calf tenderness.

IMPRESSION:  

1. Chronic obstructive pulmonary disease with acute exacerbation.

2. Acute on chronic diastolic heart failure.

3. Hypertension.

 

PLAN:  

1. The patient is okay for discharge from my perspective.  She needs an oral steroid

taper. 

2. The patient should continue her bronchodilators.

3. Continue current cardiac regimen.

 

 

 

 

______________________________

MD CHUY Yip/NAGI

D:  10/10/2019 07:59:59

T:  10/10/2019 08:26:23

Job #:  [id number]

## 2019-10-10 NOTE — NUR
Patient assisted to cleaned and changed diaper/pads at this time. patient tolerated well. 
Will continue to monitor.

## 2019-10-10 NOTE — PROGRESS NOTE
DATE:  

 

Internal Medicine Progress Note 

 

SUBJECTIVE:  The patient is feeling better today.

 

PHYSICAL EXAMINATION:

VITAL SIGNS:  Blood pressure 136/59, temperature 98.7, heart rate is 85 per minute,

respiratory rate 18 per minute, and oxygen saturation 92%. 

HEART:  Show regular rhythm.  Normal S1 and S2 sound. 

LUNGS:  Showing decreased breath sounds bilaterally. 

ABDOMEN:  Soft. 

EXTREMITIES:  Show no evidence of cyanosis or hematoma.

LABORATORY DATA:  On BMP; sodium 135, potassium 4.3, chloride 99, CO2 of 27, BUN 14,

creatinine 0.72, and glucose 120.  CBC; white blood count 9.09, hemoglobin 11.8,

hematocrit 36.7, and platelet count of 215,000. 

 

FINAL IMPRESSION:  

1. Acute respiratory failure secondary to chronic obstructive pulmonary disease

exacerbation. 

2. Chronic obstructive pulmonary disease exacerbation.

3. Acute on chronic diastolic congestive heart failure.

4. Hypertension.

5. History of smoking.

 

PLAN OF TREATMENT:  Continue albuterol and Atrovent every 4 hours.  The patient got the

pneumonia vaccine.  Continue albuterol every 4 hours as needed, amlodipine 10 mg daily,

Protonix 40 mg daily, Solu-Medrol 30 mg IV twice a day, Lipitor 20 mg daily, Serevent

one inhalation twice a day, Tylenol 650 mg every 4 hours as needed for pain or fever,

bupropion 150 mg daily, doxycycline 100 mg twice a day, furosemide 40 mg daily,

citalopram 20 mg daily, and nicotine patch 21 mg daily due to the history of smoking.

Tentative discharge for tomorrow as long as the patient keeps__________. 

 

 

 

 

______________________________

MD AL Simpson/NAGI

D:  10/10/2019 09:31:55

T:  10/10/2019 11:23:31

Job #:  [id number]

## 2019-10-11 VITALS — DIASTOLIC BLOOD PRESSURE: 84 MMHG | SYSTOLIC BLOOD PRESSURE: 119 MMHG

## 2019-10-11 VITALS — SYSTOLIC BLOOD PRESSURE: 123 MMHG | DIASTOLIC BLOOD PRESSURE: 66 MMHG

## 2019-10-11 VITALS — DIASTOLIC BLOOD PRESSURE: 66 MMHG | SYSTOLIC BLOOD PRESSURE: 123 MMHG

## 2019-10-11 VITALS — SYSTOLIC BLOOD PRESSURE: 114 MMHG | DIASTOLIC BLOOD PRESSURE: 65 MMHG

## 2019-10-11 VITALS — DIASTOLIC BLOOD PRESSURE: 87 MMHG | SYSTOLIC BLOOD PRESSURE: 101 MMHG

## 2019-10-11 RX ADMIN — IPRATROPIUM BROMIDE AND ALBUTEROL SULFATE SCH ML: .5; 2.5 SOLUTION RESPIRATORY (INHALATION) at 11:00

## 2019-10-11 RX ADMIN — Medication PRN MG: at 06:34

## 2019-10-11 RX ADMIN — NICOTINE SCH MG: 21 PATCH, EXTENDED RELEASE TRANSDERMAL at 09:15

## 2019-10-11 RX ADMIN — ATORVASTATIN CALCIUM SCH MG: 20 TABLET, FILM COATED ORAL at 06:34

## 2019-10-11 RX ADMIN — Medication PRN MG: at 18:14

## 2019-10-11 RX ADMIN — FLUTICASONE PROPIONATE AND SALMETEROL SCH EA: 50; 250 POWDER RESPIRATORY (INHALATION) at 11:15

## 2019-10-11 RX ADMIN — CITALOPRAM HYDROBROMIDE SCH MG: 20 TABLET, FILM COATED ORAL at 09:15

## 2019-10-11 RX ADMIN — Medication SCH MG: at 09:15

## 2019-10-11 RX ADMIN — METHYLPREDNISOLONE SODIUM SUCCINATE SCH MG: 40 INJECTION, POWDER, LYOPHILIZED, FOR SOLUTION INTRAMUSCULAR; INTRAVENOUS at 09:15

## 2019-10-11 RX ADMIN — BUPROPION HYDROCHLORIDE SCH MG: 150 TABLET, FILM COATED ORAL at 09:15

## 2019-10-11 RX ADMIN — Medication PRN MG: at 15:06

## 2019-10-11 RX ADMIN — FUROSEMIDE SCH MG: 10 INJECTION, SOLUTION INTRAMUSCULAR; INTRAVENOUS at 09:15

## 2019-10-11 RX ADMIN — IPRATROPIUM BROMIDE AND ALBUTEROL SULFATE SCH ML: .5; 2.5 SOLUTION RESPIRATORY (INHALATION) at 15:05

## 2019-10-11 RX ADMIN — IPRATROPIUM BROMIDE AND ALBUTEROL SULFATE SCH ML: .5; 2.5 SOLUTION RESPIRATORY (INHALATION) at 07:15

## 2019-10-11 RX ADMIN — AMLODIPINE BESYLATE SCH MG: 10 TABLET ORAL at 09:15

## 2019-10-11 RX ADMIN — PANTOPRAZOLE SODIUM SCH MG: 40 TABLET, DELAYED RELEASE ORAL at 09:15

## 2019-10-11 NOTE — NUR
bedside shift report received pt in stable condition, r hand 22g no ss of infiltration 
noted, denies any pain at this time, call light in reach will continue to monitor

## 2019-10-11 NOTE — NUR
PT DISCHARGING BACK TO [facility] TODAY

CONFIRMED WITH [facility] THAT PT DOES NOT HAVE 02 IN HER APPT

02 SATS ON ROOM AIR 91%

PT UNABLE TO AMBULATE, IS WHEELCHAIR BOUND AND ADMITS TO SMOKING CONSTANTLY

PT DISCHARGING HOME TODAY

NURSE WILL NOTIFY HOUSE SUP THAT PT NEEDS A WC VAN FOR DISCHARGE

## 2019-10-11 NOTE — PROGRESS NOTE
DATE:    

 

SUBJECTIVE:  The patient feels better.  She has less dyspnea and less cough.  She has no

congestion. 

 

PHYSICAL EXAMINATION:

VITAL SIGNS:  Stable. 

HEENT:  Shows no facial swelling or erythema. 

CARDIAC:  Reveals regular rate and rhythm with normal S1 and S2.  There are no murmurs

or rubs heard. 

LUNGS:  Auscultation of the lungs show clear breath sounds bilaterally.  There is no

wheezing. 

ABDOMEN:  Soft, nontender.  There is no rebound or guarding. 

EXTREMITIES:  Show no leg edema or calf tenderness.  There is no cyanosis or clubbing. 

SKIN:  Shows no rashes.

IMPRESSION:  

1. Chronic obstructive pulmonary disease with acute exacerbation.

2. Acute-on-chronic diastolic heart failure.

3. Hypertension.

 

PLAN:  

1. Okay for discharge.

2. Steroid taper as an outpatient.

3. Continue current bronchodilators.

4. Follow up in 1 to 2 weeks.

 

 

 

 

______________________________

MD CHUY Yip/NAGI

D:  10/11/2019 07:41:15

T:  10/11/2019 07:55:53

Job #:  [id number]

## 2019-10-11 NOTE — DISCHARGE SUMMARY
HISTORY:  The patient is a 64-year-old female, who has a past medical history positive

for COPD, came here with COPD exacerbation.  She was started on oxygen, albuterol and

Atrovent.  She had pneumonia vaccine.  She is doing a lot better right now.  The patient

is going back to the assisted living.  The patient has been seen also by Dr. David Dean, Pulmonology, who released the patient to go home. 

 

PHYSICAL EXAMINATION:

HEART:  Showed regular rhythm.  Normal S1 and S2 sound. 

LUNGS:  Clear bilaterally, but significantly decreased. 

ABDOMEN:  Soft. 

VITAL SIGNS:  Blood pressure 114/65, temperature 98.8, heart rate 71 per minute,

respiratory rate 25 per minute, oxygen saturation 96%. 

LABORATORY STUDIES:  On the BMP; sodium 135, potassium 4.3, chloride 99, CO2 of 27, BUN

14, creatinine 0.72, glucose 120.  On the CBC; white blood count 9.09, hemoglobin 11.8,

hematocrit 36.7, and platelet count of 215,000.  AST 8, ALT 14, total bilirubin 0.2,

alkaline phosphatase 71. 

 

FINAL IMPRESSION:  

1. Chronic obstructive pulmonary disease exacerbation.

2. Hypertension.

3. Hypercholesterolemia.

4. Nicotine abuse.

 

PLAN OF TREATMENT:  She is going to continue albuterol and Atrovent q.4 hours as needed

for shortness of breath, amlodipine 10 mg daily, Protonix 40 mg daily, Medrol Dosepak to

take as directed, simvastatin 20 mg daily, Symbicort one inhalation twice a day,

doxycycline 100 mg p.o. twice a day, furosemide 40 mg daily, citalopram 20 mg daily,

Nicoderm patch 21 mg daily.  The patient also has history of chronic diastolic

congestive heart failure, so she is going to be followed up by her primary care

physician in a week. 

 

 

 

 

______________________________

MD AL Simpson/NAGI

D:  10/11/2019 15:49:22

T:  10/11/2019 20:16:22

Job #:  [id number]

## 2019-10-11 NOTE — PROGRESS NOTE
DATE:  10/11/2019

 

Cardiology Progress Note 

 

SUBJECTIVE:  The patient denies chest pain or shortness of breath.

 

OBJECTIVE:  VITAL SIGNS:  Temperature 98.8 degrees, pulse 71, respiratory rate 25, blood

pressure 114/65, and oxygen saturation 96% on 2 L nasal cannula. 

GENERAL:  Awake, alert, in no acute distress. 

LUNGS:  Clear to auscultation bilaterally.  No wheezes or crackles. 

CARDIOVASCULAR:  Normal rate, regular rhythm.  No murmur.  Normal S1, S2. 

ABDOMEN:  Soft, nontender. 

EXTREMITIES:  Trace edema.

 

CARDIAC MEDICATIONS:  Amlodipine 10 mg p.o. daily, atorvastatin 20 mg p.o. daily.

 

LABORATORY DATA:  None today.

 

TELEMETRY:  Normal sinus rhythm.

 

IMPRESSION:  

1. Dyspnea.

2. Hypertension.

3. Hyperlipidemia.

4. Chronic obstructive pulmonary disease.

5. Acute-on-chronic diastolic heart failure.

 

RECOMMENDATIONS:  

1. Suspect the patient's dyspnea is combination of chronic obstructive pulmonary disease

exacerbation as well as acute-on-chronic diastolic heart failure. 

2. Continue gentle diuresis.

3. Monitor creatinine closely.

4. Replete electrolytes.

5. Treatment of chronic obstructive pulmonary disease per Pulmonary.

6. Keep patient on Telemetry while admitted.

7. Continue current cardiac medications. 

 

Thank you for this consult.  We will continue to follow.

 

 

 

 

______________________________

Padmini Landers MD

 

ABS/MODL

D:  10/11/2019 16:21:39

T:  10/11/2019 18:37:47

Job #:  [id number]

## 2019-10-11 NOTE — DISCHARGE SUMMARY
Also, she is going to need oxygen at home.  The patient is told to avoid smoking.

Nicoderm patch has been given to the patient. 

 

 

 

 

______________________________

MD AL Simpson/NAGI

D:  10/11/2019 15:49:46

T:  10/11/2019 20:10:06

Job #:  [id number]